# Patient Record
Sex: FEMALE | Race: ASIAN | NOT HISPANIC OR LATINO | Employment: FULL TIME | URBAN - METROPOLITAN AREA
[De-identification: names, ages, dates, MRNs, and addresses within clinical notes are randomized per-mention and may not be internally consistent; named-entity substitution may affect disease eponyms.]

---

## 2018-03-03 ENCOUNTER — HOSPITAL ENCOUNTER (INPATIENT)
Facility: CLINIC | Age: 28
LOS: 2 days | Discharge: HOME | DRG: 064 | End: 2018-03-06
Attending: NEUROLOGICAL SURGERY | Admitting: NEUROLOGICAL SURGERY
Payer: COMMERCIAL

## 2018-03-03 ENCOUNTER — TRANSFERRED RECORDS (OUTPATIENT)
Dept: HEALTH INFORMATION MANAGEMENT | Facility: CLINIC | Age: 28
End: 2018-03-03

## 2018-03-03 DIAGNOSIS — I60.9 SUBARACHNOID HEMORRHAGE (H): ICD-10-CM

## 2018-03-03 DIAGNOSIS — I66.02 MIDDLE CEREBRAL ARTERY STENOSIS, LEFT: Primary | ICD-10-CM

## 2018-03-03 LAB
CREAT SERPL-MCNC: 0.63 MG/DL (ref 0.6–1.1)
GFR SERPL CREATININE-BSD FRML MDRD: >60 ML/MIN/1.73M2
GLUCOSE SERPL-MCNC: 104 MG/DL (ref 70–125)
INR PPP: 0.96 (ref 0.9–1.1)
POTASSIUM SERPL-SCNC: 3.8 MMOL/L (ref 3.5–5)
TSH SERPL-ACNC: 1.56 UIU/ML (ref 0.3–5)

## 2018-03-03 ASSESSMENT — ACTIVITIES OF DAILY LIVING (ADL)
TOILETING: 0-->INDEPENDENT
FALL_HISTORY_WITHIN_LAST_SIX_MONTHS: NO
TRANSFERRING: 0-->INDEPENDENT
RETIRED_COMMUNICATION: 0-->UNDERSTANDS/COMMUNICATES WITHOUT DIFFICULTY
DRESS: 0-->INDEPENDENT
AMBULATION: 0-->INDEPENDENT
RETIRED_EATING: 0-->INDEPENDENT
SWALLOWING: 0-->SWALLOWS FOODS/LIQUIDS WITHOUT DIFFICULTY
BATHING: 0-->INDEPENDENT
COGNITION: 0 - NO COGNITION ISSUES REPORTED

## 2018-03-03 ASSESSMENT — VISUAL ACUITY: OU: BASELINE

## 2018-03-03 NOTE — IP AVS SNAPSHOT
[address] [address]    Phone:  [phone]                                       After Visit Summary   3/3/2018    Aliya Rios    MRN: [id number]           After Visit Summary Signature Page     I have received my discharge instructions, and my questions have been answered. I have discussed any challenges I see with this plan with the nurse or doctor.    ..........................................................................................................................................  Patient/Patient Representative Signature      ..........................................................................................................................................  Patient Representative Print Name and Relationship to Patient    ..................................................               ................................................  Date                                            Time    ..........................................................................................................................................  Reviewed by Signature/Title    ...................................................              ..............................................  Date                                                            Time

## 2018-03-03 NOTE — IP AVS SNAPSHOT
MRN:[id number]                      After Visit Summary   3/3/2018    Aliya Rios    MRN: [id number]           Thank you!     Thank you for choosing [facility] for your care. Our goal is always to provide you with excellent care. Hearing back from our patients is one way we can continue to improve our services. Please take a few minutes to complete the written survey that you may receive in the mail after you visit with us. Thank you!        Patient Information     Date Of Birth          1990        Designated Caregiver       Most Recent Value    Caregiver    Will someone help with your care after discharge? yes    Name of designated caregiver Amina Qi    Phone number of caregiver [phone]    Caregiver address [address]      About your hospital stay     You were admitted on:  March 3, 2018 You last received care in the:  [address] [facility]    You were discharged on:  March 6, 2018        Reason for your hospital stay       Subarachnoid Hemorrhage, found to have a Left middle cerebral artery stenosis.                  Who to Call     For medical emergencies, please call 911.  For non-urgent questions about your medical care, please call your primary care provider or clinic, None          Attending Provider     Provider Gurpreet Hawkins MD Neurosurgery    [facility], Aura Romero MD Internal Medicine       Primary Care Provider Fax #    Provider Not In System [phone]       When to contact your care team       Call 911 and return to the Emergency Room if you have any of the following:  - sudden onset excruciating headache, weakness, numbness, tingling, vertigo/lightheadedness, dizziness, vision or hearing changes or other concerning symptom.                  After Care Instructions     Activity       Your activity upon discharge: activity as tolerated            Diet       Follow this diet upon discharge: Orders Placed This Encounter      Advance Diet as  Tolerated: Regular Diet Adult            Discharge Instructions       1. Follow up with your Primary Care Provider and Stroke Clinic as described above.   2. Begin taking your prescribed medications (Aspirin and Atorvastatin).  3. Discuss with your Primary Care Provider and/or Stroke Neurologist if you would benefit from a sleep study to assess for obstructive sleep apnea.                  Follow-up Appointments     Adult [facility] Follow-up and recommended labs and tests       Follow up with primary care provider within 7 days to evaluate medication change and for hospital follow- up.  No follow up labs or test are needed.    Follow-up with Stroke Clinic in 2-3 months or at next available appointment. Follow up with an imaging study (Head CTA) prior to appointment, results as well as medication management to be discussed at appointment. You will be contacted to schedule a Stroke Clinic appointment. If you have not been contacted to schedule a stroke follow-up appointment within 7 days of discharge, please contact Stroke Neurology Clinic at [phone], pick option #1.   If you have other stroke related questions, please call [phone], pick option #3, and ask to speak to Khai Liu RN Stroke/Endovascular Care Coordinator.  If you have any urgent stroke related questions after hours, please contact the hospital  at [phone] and ask to be connected to a stroke provider.         Appointments on [place] and/or [facility] (with [facility] or [facility] provider or service). Call [phone] if you haven't heard regarding these appointments within 7 days of discharge.                  Future tests that were ordered for you     CT Angiogram head neck w & w/o contrast       F/u on Lt M1 stenosis                  Pending Results     Date and Time Order Name Status Description    3/5/2018 1046 US Transcranial Doppler Complete Port In process     3/4/2018 0721 IR Carotid Cerebral Angiogram Bilateral Preliminary   "           Statement of Approval     Ordered          18 1202  I have reviewed and agree with all the recommendations and orders detailed in this document.  EFFECTIVE NOW     Approved and electronically signed by:  Cain Lewis MD             Admission Information     Date & Time Provider Department Dept. Phone    3/3/2018 Aura Paniagua MD [address] [facility] [phone]      Your Vitals Were     Blood Pressure Temperature Respirations Weight Pulse Oximetry       117/77 (BP Location: Right arm) 97.4  F (36.3  C) (Oral) 16 78.7 kg (173 lb 8 oz) 97%       MyChart Information     [employer] lets you send messages to your doctor, view your test results, renew your prescriptions, schedule appointments and more. To sign up, go to [email or web address] . Click on \"Log in\" on the left side of the screen, which will take you to the Welcome page. Then click on \"Sign up Now\" on the right side of the page.     You will be asked to enter the access code listed below, as well as some personal information. Please follow the directions to create your username and password.     Your access code is: [email or web address]  Expires: 2018 11:59 AM     Your access code will  in 90 days. If you need help or a new code, please call your [facility] clinic or [phone].        Care EveryWhere ID     This is your Care EveryWhere ID. This could be used by other organizations to access your [facility] medical records  [id number]        Equal Access to Services     [facility]: Hadii aad ku hadasho Soomaali, waaxda luqadaha, qaybta kaalmada adeegyada, alec jones . So [facility] [phone].    ATENCIÓN: Si habla español, tiene a mancuso disposición servicios gratuitos de asistencia lingüística. Llame al [phone].    We comply with applicable federal civil rights laws and [place] laws. We do not discriminate on the basis of race, color, national origin, age, disability, sex, sexual orientation, or " gender identity.               Review of your medicines      START taking        Dose / Directions    aspirin 81 MG chewable tablet   Used for:  Middle cerebral artery stenosis, left        Dose:  81 mg   Start taking on:  3/7/2018   Take 1 tablet (81 mg) by mouth daily   Quantity:  30 tablet   Refills:  11       atorvastatin 20 MG tablet   Commonly known as:  LIPITOR   Used for:  Middle cerebral artery stenosis, left        Dose:  20 mg   Take 1 tablet (20 mg) by mouth daily   Quantity:  30 tablet   Refills:  11            Where to get your medicines      Some of these will need a paper prescription and others can be bought over the counter. Ask your nurse if you have questions.     Bring a paper prescription for each of these medications     aspirin 81 MG chewable tablet    atorvastatin 20 MG tablet                Protect others around you: Learn how to safely use, store and throw away your medicines at www.disposemymeds.org.             Medication List: This is a list of all your medications and when to take them. Check marks below indicate your daily home schedule. Keep this list as a reference.      Medications           Morning Afternoon Evening Bedtime As Needed    aspirin 81 MG chewable tablet   Take 1 tablet (81 mg) by mouth daily   Start taking on:  3/7/2018   Last time this was given:  81 mg on 3/6/2018  8:40 AM                                atorvastatin 20 MG tablet   Commonly known as:  LIPITOR   Take 1 tablet (20 mg) by mouth daily   Last time this was given:  20 mg on 3/6/2018 11:48 AM

## 2018-03-04 ENCOUNTER — APPOINTMENT (OUTPATIENT)
Dept: ULTRASOUND IMAGING | Facility: CLINIC | Age: 28
DRG: 064 | End: 2018-03-04
Attending: STUDENT IN AN ORGANIZED HEALTH CARE EDUCATION/TRAINING PROGRAM
Payer: COMMERCIAL

## 2018-03-04 ENCOUNTER — APPOINTMENT (OUTPATIENT)
Dept: INTERVENTIONAL RADIOLOGY/VASCULAR | Facility: CLINIC | Age: 28
DRG: 064 | End: 2018-03-04
Attending: NEUROLOGICAL SURGERY
Payer: COMMERCIAL

## 2018-03-04 PROBLEM — I60.9 SUBARACHNOID HEMORRHAGE (H): Status: ACTIVE | Noted: 2018-03-04

## 2018-03-04 LAB
ABO + RH BLD: NORMAL
ABO + RH BLD: NORMAL
ANION GAP SERPL CALCULATED.3IONS-SCNC: 7 MMOL/L (ref 3–14)
APTT PPP: 30 SEC (ref 22–37)
B-HCG SERPL-ACNC: <1 IU/L (ref 0–5)
BLD GP AB SCN SERPL QL: NORMAL
BLOOD BANK CMNT PATIENT-IMP: NORMAL
BUN SERPL-MCNC: 10 MG/DL (ref 7–30)
CALCIUM SERPL-MCNC: 8.5 MG/DL (ref 8.5–10.1)
CHLORIDE SERPL-SCNC: 109 MMOL/L (ref 94–109)
CO2 SERPL-SCNC: 24 MMOL/L (ref 20–32)
CREAT SERPL-MCNC: 0.55 MG/DL (ref 0.52–1.04)
ERYTHROCYTE [DISTWIDTH] IN BLOOD BY AUTOMATED COUNT: 16.4 % (ref 10–15)
FIBRINOGEN PPP-MCNC: 300 MG/DL (ref 200–420)
GFR SERPL CREATININE-BSD FRML MDRD: >90 ML/MIN/1.7M2
GLUCOSE SERPL-MCNC: 86 MG/DL (ref 70–99)
HCT VFR BLD AUTO: 33.5 % (ref 35–47)
HGB BLD-MCNC: 10.4 G/DL (ref 11.7–15.7)
INR PPP: 0.98 (ref 0.86–1.14)
MCH RBC QN AUTO: 24.6 PG (ref 26.5–33)
MCHC RBC AUTO-ENTMCNC: 31 G/DL (ref 31.5–36.5)
MCV RBC AUTO: 79 FL (ref 78–100)
MRSA DNA SPEC QL NAA+PROBE: NEGATIVE
PLATELET # BLD AUTO: 325 10E9/L (ref 150–450)
POTASSIUM SERPL-SCNC: 3.5 MMOL/L (ref 3.4–5.3)
RBC # BLD AUTO: 4.23 10E12/L (ref 3.8–5.2)
SODIUM SERPL-SCNC: 140 MMOL/L (ref 133–144)
SPECIMEN EXP DATE BLD: NORMAL
SPECIMEN SOURCE: NORMAL
TROPONIN I SERPL-MCNC: <0.015 UG/L (ref 0–0.04)
WBC # BLD AUTO: 8.6 10E9/L (ref 4–11)

## 2018-03-04 PROCEDURE — 87641 MR-STAPH DNA AMP PROBE: CPT | Performed by: NEUROLOGICAL SURGERY

## 2018-03-04 PROCEDURE — 25000128 H RX IP 250 OP 636: Performed by: RADIOLOGY

## 2018-03-04 PROCEDURE — 93010 ELECTROCARDIOGRAM REPORT: CPT | Performed by: INTERNAL MEDICINE

## 2018-03-04 PROCEDURE — 93886 INTRACRANIAL COMPLETE STUDY: CPT

## 2018-03-04 PROCEDURE — 93005 ELECTROCARDIOGRAM TRACING: CPT

## 2018-03-04 PROCEDURE — 80048 BASIC METABOLIC PNL TOTAL CA: CPT | Performed by: NEUROLOGICAL SURGERY

## 2018-03-04 PROCEDURE — 85027 COMPLETE CBC AUTOMATED: CPT | Performed by: NEUROLOGICAL SURGERY

## 2018-03-04 PROCEDURE — 25000125 ZZHC RX 250: Performed by: STUDENT IN AN ORGANIZED HEALTH CARE EDUCATION/TRAINING PROGRAM

## 2018-03-04 PROCEDURE — 87640 STAPH A DNA AMP PROBE: CPT | Performed by: NEUROLOGICAL SURGERY

## 2018-03-04 PROCEDURE — 25000132 ZZH RX MED GY IP 250 OP 250 PS 637: Performed by: STUDENT IN AN ORGANIZED HEALTH CARE EDUCATION/TRAINING PROGRAM

## 2018-03-04 PROCEDURE — 99153 MOD SED SAME PHYS/QHP EA: CPT

## 2018-03-04 PROCEDURE — 86850 RBC ANTIBODY SCREEN: CPT | Performed by: NEUROLOGICAL SURGERY

## 2018-03-04 PROCEDURE — 20000004 ZZH R&B ICU UMMC

## 2018-03-04 PROCEDURE — 36415 COLL VENOUS BLD VENIPUNCTURE: CPT | Performed by: NEUROLOGICAL SURGERY

## 2018-03-04 PROCEDURE — 85730 THROMBOPLASTIN TIME PARTIAL: CPT | Performed by: NEUROLOGICAL SURGERY

## 2018-03-04 PROCEDURE — 36227 PLACE CATH XTRNL CAROTID: CPT

## 2018-03-04 PROCEDURE — 85610 PROTHROMBIN TIME: CPT | Performed by: NEUROLOGICAL SURGERY

## 2018-03-04 PROCEDURE — 36224 PLACE CATH CAROTD ART: CPT | Mod: XS

## 2018-03-04 PROCEDURE — 86900 BLOOD TYPING SEROLOGIC ABO: CPT | Performed by: NEUROLOGICAL SURGERY

## 2018-03-04 PROCEDURE — 61640 DILATE IC VASOSPASM INIT: CPT

## 2018-03-04 PROCEDURE — 25000125 ZZHC RX 250: Performed by: RADIOLOGY

## 2018-03-04 PROCEDURE — 27210802 ZZH SHEATH CR1

## 2018-03-04 PROCEDURE — 25000128 H RX IP 250 OP 636: Performed by: NEUROLOGICAL SURGERY

## 2018-03-04 PROCEDURE — 84484 ASSAY OF TROPONIN QUANT: CPT | Performed by: NEUROLOGICAL SURGERY

## 2018-03-04 PROCEDURE — 27210738 ZZH ACCESSORY CR2

## 2018-03-04 PROCEDURE — 27210886 ZZH ACCESSORY CR5

## 2018-03-04 PROCEDURE — 36226 PLACE CATH VERTEBRAL ART: CPT

## 2018-03-04 PROCEDURE — 80061 LIPID PANEL: CPT | Performed by: NEUROLOGICAL SURGERY

## 2018-03-04 PROCEDURE — C1769 GUIDE WIRE: HCPCS

## 2018-03-04 PROCEDURE — 85384 FIBRINOGEN ACTIVITY: CPT | Performed by: NEUROLOGICAL SURGERY

## 2018-03-04 PROCEDURE — B31Q1ZZ FLUOROSCOPY OF CERVICO-CEREBRAL ARCH USING LOW OSMOLAR CONTRAST: ICD-10-PCS | Performed by: NEUROLOGICAL SURGERY

## 2018-03-04 PROCEDURE — 84702 CHORIONIC GONADOTROPIN TEST: CPT | Performed by: NEUROLOGICAL SURGERY

## 2018-03-04 PROCEDURE — 27810149 ZZH COIL/EMBOLIC DEVICE CR12

## 2018-03-04 PROCEDURE — 86901 BLOOD TYPING SEROLOGIC RH(D): CPT | Performed by: NEUROLOGICAL SURGERY

## 2018-03-04 PROCEDURE — 99152 MOD SED SAME PHYS/QHP 5/>YRS: CPT

## 2018-03-04 PROCEDURE — 27210907 ZZH KIT CR9

## 2018-03-04 RX ORDER — FENTANYL CITRATE 50 UG/ML
25-50 INJECTION, SOLUTION INTRAMUSCULAR; INTRAVENOUS EVERY 5 MIN PRN
Status: DISCONTINUED | OUTPATIENT
Start: 2018-03-04 | End: 2018-03-04 | Stop reason: HOSPADM

## 2018-03-04 RX ORDER — POTASSIUM CHLORIDE 7.45 MG/ML
10 INJECTION INTRAVENOUS
Status: DISCONTINUED | OUTPATIENT
Start: 2018-03-04 | End: 2018-03-06 | Stop reason: HOSPADM

## 2018-03-04 RX ORDER — ACETAMINOPHEN 325 MG/1
325 TABLET ORAL EVERY 4 HOURS PRN
Status: DISCONTINUED | OUTPATIENT
Start: 2018-03-04 | End: 2018-03-04

## 2018-03-04 RX ORDER — LIDOCAINE 40 MG/G
CREAM TOPICAL
Status: DISCONTINUED | OUTPATIENT
Start: 2018-03-04 | End: 2018-03-04 | Stop reason: HOSPADM

## 2018-03-04 RX ORDER — IODIXANOL 320 MG/ML
150 INJECTION, SOLUTION INTRAVASCULAR ONCE
Status: COMPLETED | OUTPATIENT
Start: 2018-03-04 | End: 2018-03-04

## 2018-03-04 RX ORDER — POTASSIUM CHLORIDE 29.8 MG/ML
20 INJECTION INTRAVENOUS
Status: DISCONTINUED | OUTPATIENT
Start: 2018-03-04 | End: 2018-03-06 | Stop reason: HOSPADM

## 2018-03-04 RX ORDER — POTASSIUM CHLORIDE 750 MG/1
20-40 TABLET, EXTENDED RELEASE ORAL
Status: DISCONTINUED | OUTPATIENT
Start: 2018-03-04 | End: 2018-03-06 | Stop reason: HOSPADM

## 2018-03-04 RX ORDER — SODIUM CHLORIDE 9 MG/ML
INJECTION, SOLUTION INTRAVENOUS CONTINUOUS
Status: DISCONTINUED | OUTPATIENT
Start: 2018-03-04 | End: 2018-03-05

## 2018-03-04 RX ORDER — VERAPAMIL HYDROCHLORIDE 2.5 MG/ML
15 INJECTION, SOLUTION INTRAVENOUS ONCE
Status: COMPLETED | OUTPATIENT
Start: 2018-03-04 | End: 2018-03-04

## 2018-03-04 RX ORDER — SODIUM CHLORIDE 9 MG/ML
INJECTION, SOLUTION INTRAVENOUS CONTINUOUS
Status: DISCONTINUED | OUTPATIENT
Start: 2018-03-04 | End: 2018-03-04

## 2018-03-04 RX ORDER — ACETAMINOPHEN 325 MG/1
325 TABLET ORAL EVERY 4 HOURS PRN
Status: DISCONTINUED | OUTPATIENT
Start: 2018-03-04 | End: 2018-03-06 | Stop reason: HOSPADM

## 2018-03-04 RX ORDER — POTASSIUM CHLORIDE 1.5 G/1.58G
20-40 POWDER, FOR SOLUTION ORAL
Status: DISCONTINUED | OUTPATIENT
Start: 2018-03-04 | End: 2018-03-06 | Stop reason: HOSPADM

## 2018-03-04 RX ORDER — POTASSIUM CL/LIDO/0.9 % NACL 10MEQ/0.1L
10 INTRAVENOUS SOLUTION, PIGGYBACK (ML) INTRAVENOUS
Status: DISCONTINUED | OUTPATIENT
Start: 2018-03-04 | End: 2018-03-06 | Stop reason: HOSPADM

## 2018-03-04 RX ORDER — METOCLOPRAMIDE HYDROCHLORIDE 5 MG/ML
10 INJECTION INTRAMUSCULAR; INTRAVENOUS EVERY 6 HOURS PRN
Status: DISCONTINUED | OUTPATIENT
Start: 2018-03-04 | End: 2018-03-06 | Stop reason: HOSPADM

## 2018-03-04 RX ORDER — PROCHLORPERAZINE MALEATE 10 MG
10 TABLET ORAL EVERY 6 HOURS PRN
Status: DISCONTINUED | OUTPATIENT
Start: 2018-03-04 | End: 2018-03-06 | Stop reason: HOSPADM

## 2018-03-04 RX ORDER — FLUMAZENIL 0.1 MG/ML
0.2 INJECTION, SOLUTION INTRAVENOUS
Status: DISCONTINUED | OUTPATIENT
Start: 2018-03-04 | End: 2018-03-04 | Stop reason: HOSPADM

## 2018-03-04 RX ORDER — PROCHLORPERAZINE 25 MG
25 SUPPOSITORY, RECTAL RECTAL EVERY 12 HOURS PRN
Status: DISCONTINUED | OUTPATIENT
Start: 2018-03-04 | End: 2018-03-06 | Stop reason: HOSPADM

## 2018-03-04 RX ORDER — NALOXONE HYDROCHLORIDE 0.4 MG/ML
.1-.4 INJECTION, SOLUTION INTRAMUSCULAR; INTRAVENOUS; SUBCUTANEOUS
Status: DISCONTINUED | OUTPATIENT
Start: 2018-03-04 | End: 2018-03-06 | Stop reason: HOSPADM

## 2018-03-04 RX ORDER — OXYCODONE HYDROCHLORIDE 5 MG/1
10 TABLET ORAL EVERY 8 HOURS PRN
Status: DISCONTINUED | OUTPATIENT
Start: 2018-03-04 | End: 2018-03-06 | Stop reason: HOSPADM

## 2018-03-04 RX ORDER — VERAPAMIL HYDROCHLORIDE 40 MG/1
40 TABLET ORAL EVERY 8 HOURS
Status: DISCONTINUED | OUTPATIENT
Start: 2018-03-04 | End: 2018-03-04

## 2018-03-04 RX ORDER — NALOXONE HYDROCHLORIDE 0.4 MG/ML
.1-.4 INJECTION, SOLUTION INTRAMUSCULAR; INTRAVENOUS; SUBCUTANEOUS
Status: DISCONTINUED | OUTPATIENT
Start: 2018-03-04 | End: 2018-03-04 | Stop reason: HOSPADM

## 2018-03-04 RX ORDER — METOCLOPRAMIDE 5 MG/1
10 TABLET ORAL EVERY 6 HOURS PRN
Status: DISCONTINUED | OUTPATIENT
Start: 2018-03-04 | End: 2018-03-06 | Stop reason: HOSPADM

## 2018-03-04 RX ORDER — VERAPAMIL HYDROCHLORIDE 40 MG/1
40 TABLET ORAL EVERY 8 HOURS
Status: DISCONTINUED | OUTPATIENT
Start: 2018-03-04 | End: 2018-03-05

## 2018-03-04 RX ORDER — MAGNESIUM SULFATE HEPTAHYDRATE 40 MG/ML
4 INJECTION, SOLUTION INTRAVENOUS EVERY 4 HOURS PRN
Status: DISCONTINUED | OUTPATIENT
Start: 2018-03-04 | End: 2018-03-04

## 2018-03-04 RX ORDER — ONDANSETRON 4 MG/1
4 TABLET, ORALLY DISINTEGRATING ORAL EVERY 6 HOURS PRN
Status: DISCONTINUED | OUTPATIENT
Start: 2018-03-04 | End: 2018-03-06 | Stop reason: HOSPADM

## 2018-03-04 RX ORDER — ONDANSETRON 2 MG/ML
4 INJECTION INTRAMUSCULAR; INTRAVENOUS EVERY 6 HOURS PRN
Status: DISCONTINUED | OUTPATIENT
Start: 2018-03-04 | End: 2018-03-06 | Stop reason: HOSPADM

## 2018-03-04 RX ORDER — SODIUM CHLORIDE 9 MG/ML
INJECTION, SOLUTION INTRAVENOUS CONTINUOUS
Status: DISCONTINUED | OUTPATIENT
Start: 2018-03-04 | End: 2018-03-04 | Stop reason: HOSPADM

## 2018-03-04 RX ADMIN — LIDOCAINE HYDROCHLORIDE 3 ML: 10 INJECTION, SOLUTION EPIDURAL; INFILTRATION; INTRACAUDAL; PERINEURAL at 10:45

## 2018-03-04 RX ADMIN — VERAPAMIL HYDROCHLORIDE 40 MG: 40 TABLET, FILM COATED ORAL at 15:09

## 2018-03-04 RX ADMIN — MIDAZOLAM 1.5 MG: 1 INJECTION INTRAMUSCULAR; INTRAVENOUS at 10:45

## 2018-03-04 RX ADMIN — Medication 2 G: at 22:23

## 2018-03-04 RX ADMIN — ACETAMINOPHEN 325 MG: 325 TABLET, FILM COATED ORAL at 14:00

## 2018-03-04 RX ADMIN — FENTANYL CITRATE 75 MCG: 50 INJECTION, SOLUTION INTRAMUSCULAR; INTRAVENOUS at 10:46

## 2018-03-04 RX ADMIN — IODIXANOL 30 ML: 320 INJECTION, SOLUTION INTRAVASCULAR at 10:40

## 2018-03-04 RX ADMIN — SODIUM CHLORIDE: 9 INJECTION, SOLUTION INTRAVENOUS at 11:16

## 2018-03-04 RX ADMIN — VERAPAMIL HYDROCHLORIDE 40 MG: 40 TABLET, FILM COATED ORAL at 22:34

## 2018-03-04 RX ADMIN — SODIUM CHLORIDE: 9 INJECTION, SOLUTION INTRAVENOUS at 00:54

## 2018-03-04 RX ADMIN — Medication 2 G: at 15:03

## 2018-03-04 RX ADMIN — VERAPAMIL HYDROCHLORIDE 15 MG: 2.5 INJECTION INTRAVENOUS at 10:12

## 2018-03-04 ASSESSMENT — VISUAL ACUITY
OU: NORMAL ACUITY
OU: BASELINE
OU: NORMAL ACUITY
OU: BASELINE
OU: NORMAL ACUITY
OU: BASELINE
OU: BASELINE
OU: NORMAL ACUITY
OU: BASELINE
OU: NORMAL ACUITY
OU: BASELINE
OU: NORMAL ACUITY
OU: NORMAL ACUITY
OU: BASELINE
OU: BASELINE
OU: NORMAL ACUITY
OU: BASELINE
OU: NORMAL ACUITY
OU: BASELINE

## 2018-03-04 ASSESSMENT — PAIN DESCRIPTION - DESCRIPTORS: DESCRIPTORS: ACHING;DISCOMFORT;THROBBING

## 2018-03-04 NOTE — PROGRESS NOTES
Neurosurgery Brief Note    Patient underwent angiogram which did not reveal aneurysms. She's now under the care of neuro-critical care team. Neurosurgery will sign off. Please page with questions or concerns.    -----------------------------------  Nelia Walters MD, MS  Neurosurgery PGY-1

## 2018-03-04 NOTE — PROGRESS NOTES
[facility], [place]     Endovascular Surgical Neuroradiology Pre-Procedure Note      HPI:  Aliya Rios is a 28 year old female. She had a sudden onset of headache yesterday morning, She had difficulty finding words and felt like her cognition was not normal. She returned to her baseline normal. She was evaluated at another hospital. Work-up CT and MRI showed left frontal peripheral subarachnoid hemorrhage. No aneurysm or other vascular malformation was found. She was transferred to [facility] for further cares. Today we are going to perform a diagnostic cerebral angiography to further assess for etiology of her subarachnoid hemorrhage with intention to treat.     Medical History:  History reviewed. No pertinent past medical history.    Surgical History:  No past surgical history on file.    Family History:  No family history on file.    Social History:  Social History     Social History     Marital status: N/A     Spouse name: N/A     Number of children: N/A     Years of education: N/A     Occupational History     Not on file.     Social History Main Topics     Smoking status: Not on file     Smokeless tobacco: Not on file     Alcohol use Not on file     Drug use: Not on file     Sexual activity: Not on file     Other Topics Concern     Not on file     Social History Narrative     No narrative on file       Allergies:  Not on File    Is there a contrast allergy?  No    Medications:  No prescriptions prior to admission.   .    ROS:  The 10 point Review of Systems is negative other than noted in the HPI or here.     PHYSICAL EXAMINATION  Vital Signs:  B/P: 107/63,  T: 97.9,  P: Data Unavailable,  R: 16    Cardio:  RRR  Pulmonary:  no respiratory distress  Abdomen:  soft, non-tender    Neurologic  Mental Status:  fully alert, attentive and oriented, speech clear and fluent  Cranial Nerves:  visual fields intact, PERRL, EOMI with normal smooth pursuit, facial movements symmetric, hearing not  formally tested but intact to conversation, palate elevation symmetric and uvula midline, no dysarthria, shoulder shrug strong bilaterally, tongue protrusion midline  Motor:  normal tone throughout, no pronator drift, strength 5/5 throughout upper and lower extremities  Sensory:  Intact to light touch x4  Coordination:  FNF intact bilaterally    Pre-procedure National Institutes of Health Stroke Scale:   Not applicable    LABS  (most recent Cr, BUN, GFR, PLT, INR, PTT within the past 7 days):    Recent Labs  Lab 03/04/18  0101   CR 0.55   BUN 10   GFRESTIMATED >90   GFRESTBLACK >90      INR 0.98   PTT 30        Platelet Function P2Y12 (PRU):  Not applicable      ASSESSMENT: Left frontal subarachnoid hemorrhage    PLAN: Diagnostic cerebral angiography        PRE-PROCEDURE SEDATION ASSESSMENT     Pre-Procedure Sedation Assessment done at 0830.    Expected Level:  Moderate Sedation    Indication:  Sedation is required to allow for neurointerventional procedure.    Consent obtained from patient after discussing the risks, benefits and alternatives.     PO Intake:  Appropriately NPO for procedure    ASA Class:  Class 2 - MILD SYSTEMIC DISEASE, NO ACUTE PROBLEMS, NO FUNCTIONAL LIMITATIONS.    Mallampati:  Grade 2:  Soft palate, base of uvula, tonsillar pillars, and portion of posterior pharyngeal wall visible    History and physical reviewed and no updates needed. I have reviewed the lab findings, diagnostic data, medications, and the plan for sedation. I have determined this patient to be an appropriate candidate for the planned sedation and procedure and have reassessed the patient IMMEDIATELY PRIOR to sedation and procedure.    Patient was discussed with the Attending, Dr. Tsang, who agrees with the plan.    Lex Bernstein   Pager: [phone]    I have reviewed the history. I have seen and examined the patient myself and agree with the assessment and plan above.  MAGNUS Tsang MD

## 2018-03-04 NOTE — PROGRESS NOTES
D: Patient arrived at 0930 . Table ready at 0900 (in house 3/3/2018; this is a diagnostic angio with intent to treat - nonemergent              Aliya Rios underwent a  Cerebral  angiogram  by Dr Bernstein and Sharan on 3/4/2018   Time out done. Yes               Patient identity confirmed with 2 identifiers  Yes               Site marked  Yes / No              Support staff present for case :Dr. Bernstein arrived at 0930.                                                           VITALIY Navarrete and Fabricio arrived at 0815.                                                           Bela Au and Bowen arrived at 0800.    A:  Patient moved to table, monitoring equipment applied. Insertion site prepared by technologist.    Start time of procedure: 0940    Puncture made to : RFA    At  0953, sheath in at 0955     Intervention done:Medications Intra-arterial:  verapamil 15 mg IA at 1012 to left ICA   Sedation Medication given:   Yes, see MAR                Medication total dose given- Versed  1.5 mg                                                        Fentanyl  75    Mcg                                                                                                             Other lidocaine 1% by MD to RFA 3 ml   IR RN Monitoring Times: Start:0933                                                   Stop:1033     Introducer removed, Manual pressure held for 10 minutes,      Groin site appearance : soft, dry, flat   End time of procedure : 1033   Additional Puncture site(s): none           P:  Patient to recover in 4A   Follow up : on 4A by neurointerventional team  Post Procedure Education:  The following information has been reviewed with 4A RN (pt sedated)   1. Maintain bedrest with right extremity straight until 1630..   2. Notify nurse immediately if having any bleeding from site, any changes in sensation,   or changes in strength.   3. Notify nurse if you have to go the bathroom, the staff will assist you.   4. Nurses will be  doing frequent assessments post procedure, which will include                   checking the pulses in your feet, groin/access site, vital signs, and neuro exam.    5. Please press your call light if you, or your family, have any questions or concerns        regarding your care.    Bedside neurocheck and RFA site check and distal pulses done on 4A with 4A VITALIY Wild at 1045, VITALIY Wild agreed to chart 1045 neurocheck.  Pt's fiance and pt's father stayed in [facility] Waiting Room so MDs can update them on findings.

## 2018-03-04 NOTE — H&P
[facility]  Neurosurgery     History and Physical    CC: Headaches    HPI: On the morning of 3/3/2018 at approximately 1000  the patient had a sudden onset of a left sided headache immediately preceding a left V2-3 distribution of numbness for approximately 10-15 minutes, during which time she had some difficulty with her speech that she describes as difficulty producing the words. Immediately following this she had a generalized feeling of weakness. After the episode she returned to her neurologic baseline and the headache subsided. She was then taken to an OSH ED where a CTA and MRI were performed which demonstrated a left frontal subacute subarachnoid hemorrhage. Neurosurgery at [facility] was consulted where the working differential included reversible cerebral vasoconstriction syndrome. Due to the lack of beds at [facility] transfer to [facility] was arranged.    No recent fevers, chills, weight change, chest pain, shortness of breath, cough, abdominal pain, nausea, vomiting, and syncope. The patient also denies headaches, double vision, blurry vision, weakness, LOC, changes in sensation, taste, smell, bowel or bladder incontinence, or other neurologic symptoms.    Past Medical History   has no past medical history on file.    Past Surgical History   has no past surgical history on file.    Family History  family history is not on file.    Social History   Denies EtOh and cigarettes.     Medications  No prescriptions prior to admission.       Allergies  Allergies not on file    ROS: 10 point ROS of systems were all negative except for pertinent positives noted in HPI.   PE:  Blood pressure 124/68, temperature 97.9  F (36.6  C), temperature source Oral, SpO2 99 %.    NEUROLOGIC:  -- Awake; Alert; oriented x 3  -- Follows commands briskly  -- +repetition, calculation, and naming  -- Speech fluent, spontaneous. No aphasia or dysarthria.  -- no gaze preference. No apparent hemineglect.  Cranial Nerves:  --  visual fields full to confrontation, PERRL 3-2mm bilat and brisk, extraocular movements intact  -- face symmetrical, tongue midline  -- sensory V1-V3 intact bilaterally  -- palate elevates symmetrically, uvula midline  -- hearing grossly intact bilat  -- Trapezii 5/5 strength bilat symmetric  -- Cerebellar: Finger nose finger without dysmetria, intact rapid alternating motions bilaterally    Motor:  Normal bulk / tone; no tremor, rigidity, or bradykinesia.  No muscle wasting or fasciculations  No Pronator Drift     Delt Bi Tri FE IP Quad Hamst TibAnt EHL Gastroc    C5 C6 C7 C8/T1 L2 L3 L4-S1 L4 L5 S1   R 5 5 5 5 5 5 5 5 5 5   L 5 5 5 5 5 5 5 5 5 5     Sensory:   intact to LT    Reflexes:     Bi Tri BR Giovanny Pat Ach Bab    C5-6 C7-8 C6 UMN L2-4 S1 UMN   R 2+ 2+ 2+ Norm 2+ 2+ Norm   L 2+ 2+ 2+ Norm 2+ 2+ Norm     Labs    Coagulation Profile  No lab results found in last 7 days.  CSFNo results for input(s): CGLU, CTP in the last 168 hours.    Invalid input(s): CCSF  MICRONo results for input(s): CULT in the last 168 hours.  Liver Function Tests  No lab results found in last 7 days.  Lactate  Invalid input(s): LACTATE    IMAGING:  No orders to display       Assessment: Aliya Rios is a 28 year old female with spontaneous SAH.     The following conditions are also present, complicating the patient's current management, as described in the Assessment and Plan:   brain compression      Plan:  - No neurosurgical intervention indicated at this time   - Requires ICU level observation at this time  - NPO. IVF  - Pre-angiogram labs.   - Plan for angiogram in AM.       The patient was discussed with the neurosurgery chief resident, who agrees with the above stated plan.    Contact the neurosurgery resident on call with questions.    Dial * * *[phone]: Enter [phone] when prompted.   Lopez Nelson MD, PhD  Neurosurgery PGY-3

## 2018-03-04 NOTE — PLAN OF CARE
Problem: Patient Care Overview  Goal: Plan of Care/Patient Progress Review  Outcome: No Change  Pt is A/Ox4 with no deficits on neuro exam. Pt was transferred here for a SAH and arrived at 2320. Stroke book and education was given to patient. Pt refused Pneumo boots but they were ordered and will be put int he room when they arrive. Pt is independent and walks with no assistance. HR was 60's all night. BP was stable with systolic's under 140 all night. Pt is on RA with stable VS. Pt walked to bathroom once and had good UO but it was unmeasured. Pt is NPO. Plan is for an Angiogram at 0900. Will continue to monitor pt and notify MD of changes.

## 2018-03-04 NOTE — PROCEDURES
[facility], [place]     Endovascular Surgical Neuroradiology Post-Procedure Note    Pre-Procedure Diagnosis:  Subarachnoid hemorrhage, left MCA stenosis  Post-Procedure Diagnosis:  Subarachnoid hemorrhage, left MCA stenosis    Procedure(s):   Diagnostic cervicocerebral angiography    Findings:  Severe stenosis of the left M1 MCA, pial collaterals from the left TJ and left PCA. 15 mg intra-arterial verapamil was given. No change in left M1 stenosis after verapamil infusion.    Plan:    - Keep right leg straight for 6 hours  - Consider antiplatelet for secondary prophylaxis  - Prevent hypotension  - Monitor neuroexam closely     Primary Surgeon:  Dr. Ro Tsang  Secondary Surgeon:  Not applicable  Secondary Surgeon Review:  None  Fellow:  Amandeep  Additional Assistants:      Prior to the start of the procedure and with procedural staff participation, I verbally confirmed: the patient s identity using two indicators, relevant allergies, that the procedure was appropriate and matched the consent or emergent situation, and that the correct equipment/implants were available. Immediately prior to starting the procedure I conducted the Time Out with the procedural staff and re-confirmed the patient s name, procedure, and site/side. (The Joint Commission universal protocol was followed.)  Yes    PRU value: Not applicable    Anesthesia:  Conscious Sedation  Medications:  Fentanyl, Midazolam 75, 1.5  Puncture site:  Right Femoral Artery    Fluoroscopy time (minutes):  15.2  Radiation dose (mGy):  321    Contrast amount (mL):  30     Estimated blood loss (mL):  3 cc    Closure:  Manual    Disposition:  Will be followed in hospital by the Neuro Critical Care/Stroke team.        Sedation Post-Procedure Summary    Sedatives: Fentanyl and Midazolam (Versed)    Vital signs and pulse oximetry were monitored and remained stable throughout the procedure, and sedation was maintained until the  procedure was complete.  The patient was monitored by staff until sedation discharge criteria were met.    Patient tolerance:  Patient tolerated the procedure well with no immediate complications.    Time of sedation in minutes:  30 minutes from beginning to end of physician one to one monitoring.    Lex Bernstein  Pager:  [phone]

## 2018-03-05 ENCOUNTER — APPOINTMENT (OUTPATIENT)
Dept: ULTRASOUND IMAGING | Facility: CLINIC | Age: 28
DRG: 064 | End: 2018-03-05
Attending: STUDENT IN AN ORGANIZED HEALTH CARE EDUCATION/TRAINING PROGRAM
Payer: COMMERCIAL

## 2018-03-05 ENCOUNTER — OFFICE VISIT (OUTPATIENT)
Dept: INTERPRETER SERVICES | Facility: CLINIC | Age: 28
End: 2018-03-05
Payer: COMMERCIAL

## 2018-03-05 ENCOUNTER — APPOINTMENT (OUTPATIENT)
Dept: CARDIOLOGY | Facility: CLINIC | Age: 28
DRG: 064 | End: 2018-03-05
Attending: STUDENT IN AN ORGANIZED HEALTH CARE EDUCATION/TRAINING PROGRAM
Payer: COMMERCIAL

## 2018-03-05 LAB
ANION GAP SERPL CALCULATED.3IONS-SCNC: 7 MMOL/L (ref 3–14)
APTT PPP: 30 SEC (ref 22–37)
BUN SERPL-MCNC: 9 MG/DL (ref 7–30)
CALCIUM SERPL-MCNC: 7.4 MG/DL (ref 8.5–10.1)
CHLORIDE SERPL-SCNC: 111 MMOL/L (ref 94–109)
CHOLEST SERPL-MCNC: 161 MG/DL
CHOLEST SERPL-MCNC: 184 MG/DL
CO2 SERPL-SCNC: 22 MMOL/L (ref 20–32)
CREAT SERPL-MCNC: 0.5 MG/DL (ref 0.52–1.04)
ERYTHROCYTE [DISTWIDTH] IN BLOOD BY AUTOMATED COUNT: 16.4 % (ref 10–15)
GFR SERPL CREATININE-BSD FRML MDRD: >90 ML/MIN/1.7M2
GLUCOSE SERPL-MCNC: 87 MG/DL (ref 70–99)
HBA1C MFR BLD: 5.2 % (ref 4.3–6)
HCT VFR BLD AUTO: 31.9 % (ref 35–47)
HDLC SERPL-MCNC: 46 MG/DL
HDLC SERPL-MCNC: 55 MG/DL
HGB BLD-MCNC: 9.8 G/DL (ref 11.7–15.7)
INR PPP: 1.05 (ref 0.86–1.14)
INTERPRETATION ECG - MUSE: NORMAL
LDLC SERPL CALC-MCNC: 112 MG/DL
LDLC SERPL CALC-MCNC: 99 MG/DL
MAGNESIUM SERPL-MCNC: 2.9 MG/DL (ref 1.6–2.3)
MCH RBC QN AUTO: 24.9 PG (ref 26.5–33)
MCHC RBC AUTO-ENTMCNC: 30.7 G/DL (ref 31.5–36.5)
MCV RBC AUTO: 81 FL (ref 78–100)
NONHDLC SERPL-MCNC: 115 MG/DL
NONHDLC SERPL-MCNC: 129 MG/DL
PHOSPHATE SERPL-MCNC: 2.6 MG/DL (ref 2.5–4.5)
PLATELET # BLD AUTO: 283 10E9/L (ref 150–450)
POTASSIUM SERPL-SCNC: 3.7 MMOL/L (ref 3.4–5.3)
RBC # BLD AUTO: 3.94 10E12/L (ref 3.8–5.2)
SODIUM SERPL-SCNC: 140 MMOL/L (ref 133–144)
TRIGL SERPL-MCNC: 79 MG/DL
TRIGL SERPL-MCNC: 87 MG/DL
WBC # BLD AUTO: 6.8 10E9/L (ref 4–11)

## 2018-03-05 PROCEDURE — 25000132 ZZH RX MED GY IP 250 OP 250 PS 637: Performed by: STUDENT IN AN ORGANIZED HEALTH CARE EDUCATION/TRAINING PROGRAM

## 2018-03-05 PROCEDURE — 85610 PROTHROMBIN TIME: CPT | Performed by: STUDENT IN AN ORGANIZED HEALTH CARE EDUCATION/TRAINING PROGRAM

## 2018-03-05 PROCEDURE — 85027 COMPLETE CBC AUTOMATED: CPT | Performed by: STUDENT IN AN ORGANIZED HEALTH CARE EDUCATION/TRAINING PROGRAM

## 2018-03-05 PROCEDURE — 80048 BASIC METABOLIC PNL TOTAL CA: CPT | Performed by: STUDENT IN AN ORGANIZED HEALTH CARE EDUCATION/TRAINING PROGRAM

## 2018-03-05 PROCEDURE — 85730 THROMBOPLASTIN TIME PARTIAL: CPT | Performed by: STUDENT IN AN ORGANIZED HEALTH CARE EDUCATION/TRAINING PROGRAM

## 2018-03-05 PROCEDURE — 80061 LIPID PANEL: CPT | Performed by: STUDENT IN AN ORGANIZED HEALTH CARE EDUCATION/TRAINING PROGRAM

## 2018-03-05 PROCEDURE — T1013 SIGN LANG/ORAL INTERPRETER: HCPCS | Mod: U3

## 2018-03-05 PROCEDURE — 25000132 ZZH RX MED GY IP 250 OP 250 PS 637: Performed by: NEUROLOGICAL SURGERY

## 2018-03-05 PROCEDURE — 83735 ASSAY OF MAGNESIUM: CPT | Performed by: STUDENT IN AN ORGANIZED HEALTH CARE EDUCATION/TRAINING PROGRAM

## 2018-03-05 PROCEDURE — 25000125 ZZHC RX 250: Performed by: STUDENT IN AN ORGANIZED HEALTH CARE EDUCATION/TRAINING PROGRAM

## 2018-03-05 PROCEDURE — 25000125 ZZHC RX 250: Performed by: NEUROLOGICAL SURGERY

## 2018-03-05 PROCEDURE — 25000128 H RX IP 250 OP 636: Performed by: RADIOLOGY

## 2018-03-05 PROCEDURE — 93306 TTE W/DOPPLER COMPLETE: CPT

## 2018-03-05 PROCEDURE — 93886 INTRACRANIAL COMPLETE STUDY: CPT

## 2018-03-05 PROCEDURE — 83036 HEMOGLOBIN GLYCOSYLATED A1C: CPT | Performed by: STUDENT IN AN ORGANIZED HEALTH CARE EDUCATION/TRAINING PROGRAM

## 2018-03-05 PROCEDURE — 84100 ASSAY OF PHOSPHORUS: CPT | Performed by: STUDENT IN AN ORGANIZED HEALTH CARE EDUCATION/TRAINING PROGRAM

## 2018-03-05 PROCEDURE — 93306 TTE W/DOPPLER COMPLETE: CPT | Mod: 26 | Performed by: INTERNAL MEDICINE

## 2018-03-05 PROCEDURE — 12000001 ZZH R&B MED SURG/OB UMMC

## 2018-03-05 PROCEDURE — 36415 COLL VENOUS BLD VENIPUNCTURE: CPT | Performed by: STUDENT IN AN ORGANIZED HEALTH CARE EDUCATION/TRAINING PROGRAM

## 2018-03-05 PROCEDURE — 25000128 H RX IP 250 OP 636: Performed by: NEUROLOGICAL SURGERY

## 2018-03-05 RX ORDER — ASPIRIN 81 MG/1
81 TABLET, CHEWABLE ORAL DAILY
Status: DISCONTINUED | OUTPATIENT
Start: 2018-03-05 | End: 2018-03-06 | Stop reason: HOSPADM

## 2018-03-05 RX ADMIN — VERAPAMIL HYDROCHLORIDE 40 MG: 40 TABLET, FILM COATED ORAL at 08:33

## 2018-03-05 RX ADMIN — ACETAMINOPHEN 325 MG: 325 TABLET, FILM COATED ORAL at 04:47

## 2018-03-05 RX ADMIN — Medication 2 G: at 05:55

## 2018-03-05 RX ADMIN — SODIUM PHOSPHATE, MONOBASIC, MONOHYDRATE AND SODIUM PHOSPHATE, DIBASIC, ANHYDROUS 10 MMOL: 276; 142 INJECTION, SOLUTION INTRAVENOUS at 10:05

## 2018-03-05 RX ADMIN — POTASSIUM CHLORIDE 20 MEQ: 750 TABLET, EXTENDED RELEASE ORAL at 09:51

## 2018-03-05 RX ADMIN — SODIUM CHLORIDE: 9 INJECTION, SOLUTION INTRAVENOUS at 04:47

## 2018-03-05 RX ADMIN — ASPIRIN 81 MG CHEWABLE TABLET 81 MG: 81 TABLET CHEWABLE at 12:44

## 2018-03-05 ASSESSMENT — VISUAL ACUITY
OU: NORMAL ACUITY
OU: NORMAL ACUITY;GLASSES
OU: NORMAL ACUITY
OU: NORMAL ACUITY
OU: NORMAL ACUITY;GLASSES
OU: NORMAL ACUITY
OU: NORMAL ACUITY;GLASSES
OU: NORMAL ACUITY
OU: NORMAL ACUITY
OU: NORMAL ACUITY;GLASSES
OU: NORMAL ACUITY
OU: NORMAL ACUITY
OU: NORMAL ACUITY;GLASSES

## 2018-03-05 ASSESSMENT — PAIN DESCRIPTION - DESCRIPTORS: DESCRIPTORS: HEADACHE

## 2018-03-05 NOTE — PLAN OF CARE
Problem: Patient Care Overview  Goal: Plan of Care/Patient Progress Review  Outcome: Improving  Neuro: Intact, TCDs obtained  Resp: Room air  Cardio: Normal sinus rhythm, SB<140 goal, Echo completed  GI: Regular diet  : Voiding  Access: PIV saline locked  Denies pain.  Ambulating independently.  Pt's mother at bedside this morning (interpretor used for her).  Potassium and phosphorus replaced per protocol (recheck in am).  P: Transfer to 6A.  Pt will transfer off cardiac monitoring in wheelchair with belongings.

## 2018-03-05 NOTE — PROGRESS NOTES
Neuroscience Intensive Care Progress Note  2018    Summary: Aliya Rios is a 28 year old year old female admitted on 3/3/2018 with left frontal SAH found to have intracranial stenosis of L M1 segement of MCA by cerebral angiogram.     Problem List:  1. Left frontal SAH  2. L M1 intracranial stenosis    24 hour events:  No acute events overnight. She does not have any symptoms currently.     24 Hour Vital Signs Summary:  Temperatures:  Current - Temp: 98.1  F (36.7  C); Max - Temp  Av  F (36.7  C)  Min: 97.4  F (36.3  C)  Max: 98.1  F (36.7  C)  Respiration range: Resp  Avg: 15.1  Min: 12  Max: 18  Pulse range: No Data Recorded  Blood pressure range: Systolic (24hrs), Av , Min:98 , Max:125   ; Diastolic (24hrs), Av, Min:56, Max:75    Pulse oximetry range: SpO2  Av.8 %  Min: 94 %  Max: 99 %    Ventilator Settings  Resp: 16      Intake/Output Summary (Last 24 hours) at 18 1104  Last data filed at 18 1000   Gross per 24 hour   Intake             1768 ml   Output              400 ml   Net             1368 ml       BP - Mean:  [70-90] 90    Current Medications:    verapamil  40 mg Oral or Feeding Tube Q8H     magnesium sulfate  2 g Intravenous Q8H       PRN Medications:  naloxone, Reason NIMOdipine order not selected, potassium chloride, potassium chloride, potassium chloride, potassium chloride with lidocaine, potassium chloride, magnesium sulfate, sodium phosphate, sodium phosphate, sodium phosphate, sodium phosphate, ondansetron **OR** ondansetron, prochlorperazine **OR** prochlorperazine **OR** prochlorperazine, metoclopramide **OR** metoclopramide, oxyCODONE IR, acetaminophen    Infusions:    Reason NIMOdipine order not selected       sodium chloride 75 mL/hr at 18 0447       Not on File    Physical Examination:  /75  Temp 98.1  F (36.7  C) (Oral)  Resp 16  Wt 78.7 kg (173 lb 8 oz)  SpO2 96%    General: NAD, lying on bed  HEENT: NC/AT. Mucous membranes  moist.  Cardiac: RRR. No murmur/rubs/gallops appreciated.  Respiratory: Good effort. CTAB. No w/r/r appreciated.  Abdominal: soft, nondistended  Ext: No edema.     Neurologic:  Mental Status: Fully alert, attentive. Speech clear and fluent.   Cranial Nerves: Visual fields intact. PERRL. EOMI with normal smooth pursuit. Facial movements symmetric. Facial sensation symmetric. Hearing intact to conversation. Palate elevation symmetric, uvula midline. No dysarthria. Shoulder shrug strong bilaterally. Tongue protrusion midline.  Motor: No abnormal movements. Normal tone throughout. No pronator drift. Strength 5/5 throughout upper and lower extremities.   Reflexes: 2+ and symmetric throughout. No clonus at ankles.   Sensory: Intact/symmetric to light touch throughout upper and lower extremities.   Coordination: FNF intact without ataxia or dysmetria.   Station/Gait: Deferred      Labs/Studies:  Recent Labs   Lab Test  03/05/18   0745  03/04/18   0101   NA  140  140   POTASSIUM  3.7  3.5   CHLORIDE  111*  109   CO2  22  24   ANIONGAP  7  7   GLC  87  86   BUN  9  10   CR  0.50*  0.55   EMILIA  7.4*  8.5   WBC  6.8  8.6   RBC  3.94  4.23   HGB  9.8*  10.4*   PLT  283  325       Recent Labs   Lab Test  03/05/18   0745  03/04/18   0101   INR  1.05  0.98   PTT  30  30       Assessment/Plan  Aliya Rios is a 28 year old year old female with SAH and L M1 intracranial stenosis.     Neuro:  # SAH:  Cortical left frontal SAH. Angiogram negative for aneurysm. Unclear etiology of SAH. TCD with increased velocity of L MCA this morning, however this would be expected given her stenosis.   -TCD tomorrow  -SBP goal < 140 mmHg  -No need for nimodipine or ICU monitoring    # Intracranial stenosis:  Likely chronic given good collateral development. Unrelated to SAH. Does not appear fully blocked, will plan for antiplatelet agent (dual antiplatelet was considered but was deferred for now given SAH).   -LDL 99  -A1c 5.2  -D/C verapamil  -ASA 81mg  qd  -Echocardiogram with bubble study    Resp:  No active issues.    CV:  No active issues.     SBP goal < 140 mmHg    Renal:  No active issues    Endo:  No active issues    Heme:  # Anemia:  Stable from admission. Recheck CBC tomorrow.    GI:  No active issues.     ID:  No active issues.     FEN: Regular diet  PPX:    DVT: SCDs     GI: not indicated  LDA: PIV x1    Code Status: Full  Dispo: Transfer to floor today    Patient discussed with attending physician Dr. Paniagua.    Eulogio Albert  Neurology PGY-2

## 2018-03-05 NOTE — PROGRESS NOTES
[facility]  NeuroICU Daily ICU Note:     Admission Date: 3/3/2018  Date of Service: 03/04/2018  Current hospital day: Hospital Day: 2         Assessment   Aliya Rios is a 28 year old female who is on the morning of 3/3/2018 at approximately 10:00  the patient had a sudden onset of a left sided headache immediately preceding a left V2-3 distribution of numbness for approximately 10-15 minutes, during which time she had some difficulty in finding the words. Immediately following this she had a generalized feeling of weakness. After the episode she returned to her neurologic baseline and the headache subsided. She was then taken to an OSH ED where a CTA and MRI were performed which demonstrated a left frontal subacute subarachnoid hemorrhage. Neurosurgery at OSH was consulted where the working differential included reversible cerebral vasoconstriction syndrome. Due to the lack of beds at OSH transfer to [facility] was arranged.     No recent fevers, chills, weight change, chest pain, shortness of breath, cough, abdominal pain, nausea, vomiting, and syncope. The patient also denies headaches, double vision, blurry vision, weakness, LOC, changes in sensation, taste, smell, bowel or bladder incontinence, or other neurologic symptoms.     When she came to [facility] her exam was stable, TCDs were done preangiogram  with high velocities in LT MCA & LT TJ,  Angiogram was done with LT M1 stenosis which seems to be chronic due to good collaterals with no aneurysm or venous thrombosis noticed. Verapamil Injection was done intracranial with no improvement in stenosis & no angioplasty was done.  Family is updated with in the presence of  at bedside.         Active Problems     LT Cortical SAH     LT M1 stenosis        Procedures     3/4 Angiogram           Plan 1.   Neuro: Lt Frontal SAH    Continue frequent neuro exams Q1 while in ICU. Notify MD for acute changes in exam.    The  differential at this point is RCVS vs Garcia Garcia with chronic LT M1 stenosis with no complete fit in either of these pictures.     Start Verapamil 40 mg Q 8 hrs &  Magnesium 2 G Q 8 hrs    Headache control with tylenol & oxycodone PRN     TCds tomorrow    2. CVS: hemodynamically stable    ECG Stable    Maintain SBP < 140    Hydralazine and labetolol PRN    Continuous cardiac monitoring while in ICU    3. Pulmonary: no issues, on room air    Continuous pulse oximetry    Supplemental oxygen PRN    Incentive spirometry Q1H while awake    Daily chest X ray as needed     4. Renal: no issues    mIVF -- TKO when patient is tolerating good PO intake    Electrolyte replacement protocol    Continue to monitor intake/output    Daily BMP    5. GI: No issues    Advance diet as tolerated to regular    Bowel regimen. PRN anti-emetics.    Protonix for ulcer ppx    Regular diet    6. Endocrine:     Continue glucose checks    7. Heme: no issues    Platelets > 100,000    INR < 1.5    Hemoglobin > 7    Daily CBC    8. ID: afebrile, normal WBC count    Continue to monitor for fevers and/or signs of infection    Cultures >> NA    Antimicrobial >> NA    9. Prophylaxis    GI: Protonix    DVT: SCDs while in bed    10. Disposition: Neuro ICU    PT/OT    Speech & swallow eval    11. Code: Full    Above patient and plan has been discussed with the neuro ICU Attending.         Medications:   Current Facility-Administered Medications   Medication     naloxone (NARCAN) injection 0.1-0.4 mg     Reason niMODipine order not selected     potassium chloride SA (K-DUR/KLOR-CON M) CR tablet 20-40 mEq     potassium chloride (KLOR-CON) Packet 20-40 mEq     potassium chloride 10 mEq in 100 mL sterile water intermittent infusion (premix)     potassium chloride 10 mEq in 100 mL intermittent infusion with 10 mg lidocaine     potassium chloride 20 mEq in 50 mL intermittent infusion     magnesium sulfate 2 g in NS intermittent infusion (PharMEDium or FV Cmpd)      sodium phosphate 10 mmol in D5W intermittent infusion     sodium phosphate 15 mmol in D5W intermittent infusion     sodium phosphate 20 mmol in D5W intermittent infusion     sodium phosphate 25 mmol in D5W intermittent infusion     sodium chloride 0.9% infusion     ondansetron (ZOFRAN-ODT) ODT tab 4 mg    Or     ondansetron (ZOFRAN) injection 4 mg     prochlorperazine (COMPAZINE) injection 10 mg    Or     prochlorperazine (COMPAZINE) tablet 10 mg    Or     prochlorperazine (COMPAZINE) Suppository 25 mg     metoclopramide (REGLAN) tablet 10 mg    Or     metoclopramide (REGLAN) injection 10 mg     oxyCODONE IR (ROXICODONE) tablet 10 mg     acetaminophen (TYLENOL) tablet 325 mg     verapamil (CALAN) tablet 40 mg     magnesium sulfate 2 g in NS intermittent infusion (PharMEDium or FV Cmpd)   .    Allergies: Not on File.    Family History: No family history on file..    Social History:   Social History   Substance Use Topics     Smoking status: Not on file     Smokeless tobacco: Not on file     Alcohol use Not on file            Objective   Temp:  [97.9  F (36.6  C)-98.1  F (36.7  C)] 98.1  F (36.7  C)  Heart Rate:  [53-75] 65  Resp:  [12-18] 14  BP: ()/(56-83) 105/60  SpO2:  [92 %-99 %] 94 %    No Data Recorded    Resp: 14    I/O last 3 completed shifts:  In: 915 [I.V.:915]  Out: -     Physical Exam  General: NAD, lying comfortably in bed  Constitutional: No distress.   HENT:   Head: Atraumatic.   Mouth/Throat: Oropharynx is clear and moist. No oropharyngeal exudate.   Cardiovascular: Normal heart sounds and intact distal pulses.    Pulmonary/Chest: Breath sounds normal. No respiratory distress.   Abdominal: Soft. Bowel sounds are normal. There is no tenderness.   Musculoskeletal: She exhibits no edema or tenderness.    Skin: Skin is warm. No rash noted. She is not diaphoretic.  Neurologic    Mental Status:       -- Awake; Alert; oriented x 3      -- Follows commands       -- Speech fluent, spontaneous. No  aphasia or dysarthria.      -- no gaze preference. No apparent hemineglect.    Cranial Nerves:      -- visual fields full to confrontation, PERRL 3-2mm bilat and brisk      -- extraocular movements intact      -- face symmetrical, tongue midline      -- sensory V1-V3 intact bilaterally      -- palate elevates symmetrically, uvula midline      -- hearing grossly intact bilat    Motor:    Delt Bi Tri WE WF    R 5 5 5 5 5 5   L 5 5 5 5 5 5    IP Quad Ham DF PF EHL   R 5 5 5 5 5 5   L 5 5 5 5 5 5     Sensory: symmetrically intact to light touch x4 extremities.     Reflexes: 2+ bilaterally and symmetric in biceps, triceps, brachioradialis, and patellae; no ankle clonus bilaterally; negative Babinski bilaterally; negative Mccoy's bilaterally      Labs and Imaging   BMP  Recent Labs  Lab 03/04/18  0101      POTASSIUM 3.5   CHLORIDE 109   CO2 24   BUN 10   CR 0.55   EMILIA 8.5       CBC  Recent Labs  Lab 03/04/18  0101   WBC 8.6   HGB 10.4*          COAGS  Recent Labs  Lab 03/04/18  0101   INR 0.98   PTT 30   FIBR 300       ABGNo results for input(s): PH, PCO2, PO2, HCO3 in the last 168 hours.    CRP/ESRNo results for input(s): CRP in the last 168 hours.    Invalid input(s): ESR    CSFNo results for input(s): CGLU, CTP in the last 168 hours.    Invalid input(s): CCSF    MICRONo results for input(s): CULT in the last 168 hours.    LIPID Profile: No results found for: CHOL  No results found for: HDL  No results found for: LDL  No results found for: TRIG  No results found for: CHOLHDLRATIO    IMAGING:   Reviewed

## 2018-03-05 NOTE — PLAN OF CARE
Problem: Patient Care Overview  Goal: Plan of Care/Patient Progress Review  Outcome: No Change  Pt is A/Ox4 with no deficits noted on Neuro exam. HR was SR-SB depending on if she is sleeping or not. Pt is independent and call light appropriate. VSS and Pt is on RA. Good UO when she goes. Pt on regular diet. Will continue to monitor and notify MD of changes.

## 2018-03-06 ENCOUNTER — OFFICE VISIT (OUTPATIENT)
Dept: INTERPRETER SERVICES | Facility: CLINIC | Age: 28
End: 2018-03-06
Payer: COMMERCIAL

## 2018-03-06 ENCOUNTER — APPOINTMENT (OUTPATIENT)
Dept: ULTRASOUND IMAGING | Facility: CLINIC | Age: 28
DRG: 064 | End: 2018-03-06
Attending: STUDENT IN AN ORGANIZED HEALTH CARE EDUCATION/TRAINING PROGRAM
Payer: COMMERCIAL

## 2018-03-06 VITALS
DIASTOLIC BLOOD PRESSURE: 77 MMHG | SYSTOLIC BLOOD PRESSURE: 117 MMHG | RESPIRATION RATE: 16 BRPM | OXYGEN SATURATION: 97 % | TEMPERATURE: 97.4 F | WEIGHT: 173.5 LBS

## 2018-03-06 LAB
ERYTHROCYTE [DISTWIDTH] IN BLOOD BY AUTOMATED COUNT: 16.2 % (ref 10–15)
GLUCOSE BLDC GLUCOMTR-MCNC: 93 MG/DL (ref 70–99)
HCT VFR BLD AUTO: 34.3 % (ref 35–47)
HGB BLD-MCNC: 10.5 G/DL (ref 11.7–15.7)
MCH RBC QN AUTO: 24.8 PG (ref 26.5–33)
MCHC RBC AUTO-ENTMCNC: 30.6 G/DL (ref 31.5–36.5)
MCV RBC AUTO: 81 FL (ref 78–100)
PLATELET # BLD AUTO: 267 10E9/L (ref 150–450)
RBC # BLD AUTO: 4.24 10E12/L (ref 3.8–5.2)
WBC # BLD AUTO: 7.8 10E9/L (ref 4–11)

## 2018-03-06 PROCEDURE — 25000132 ZZH RX MED GY IP 250 OP 250 PS 637: Performed by: STUDENT IN AN ORGANIZED HEALTH CARE EDUCATION/TRAINING PROGRAM

## 2018-03-06 PROCEDURE — 36415 COLL VENOUS BLD VENIPUNCTURE: CPT | Performed by: INTERNAL MEDICINE

## 2018-03-06 PROCEDURE — 85027 COMPLETE CBC AUTOMATED: CPT | Performed by: INTERNAL MEDICINE

## 2018-03-06 PROCEDURE — 93886 INTRACRANIAL COMPLETE STUDY: CPT

## 2018-03-06 PROCEDURE — 00000146 ZZHCL STATISTIC GLUCOSE BY METER IP

## 2018-03-06 PROCEDURE — T1013 SIGN LANG/ORAL INTERPRETER: HCPCS | Mod: U3

## 2018-03-06 RX ORDER — ATORVASTATIN CALCIUM 20 MG/1
20 TABLET, FILM COATED ORAL DAILY
Status: DISCONTINUED | OUTPATIENT
Start: 2018-03-06 | End: 2018-03-06 | Stop reason: HOSPADM

## 2018-03-06 RX ORDER — ATORVASTATIN CALCIUM 20 MG/1
20 TABLET, FILM COATED ORAL DAILY
Qty: 30 TABLET | Refills: 11 | Status: SHIPPED | OUTPATIENT
Start: 2018-03-06 | End: 2022-10-14

## 2018-03-06 RX ORDER — ASPIRIN 81 MG/1
81 TABLET, CHEWABLE ORAL DAILY
Qty: 30 TABLET | Refills: 11 | Status: SHIPPED | OUTPATIENT
Start: 2018-03-07

## 2018-03-06 RX ADMIN — ASPIRIN 81 MG CHEWABLE TABLET 81 MG: 81 TABLET CHEWABLE at 08:40

## 2018-03-06 RX ADMIN — ATORVASTATIN CALCIUM 20 MG: 20 TABLET, FILM COATED ORAL at 11:48

## 2018-03-06 ASSESSMENT — VISUAL ACUITY
OU: GLASSES;BASELINE
OU: NORMAL ACUITY;GLASSES

## 2018-03-06 NOTE — PLAN OF CARE
Problem: Patient Care Overview  Goal: Plan of Care/Patient Progress Review  VSS. A&Ox4. Neuros intact. Denies pain. Voiding spontaneously. PIV SL'd. Up independently. Reg diet. Please offer warm water, pitcher of hot water with a cup of room temp water & extra cup for them to mix ([facility] cultural preference). On CCM. Cont to monitor and with POC.

## 2018-03-06 NOTE — PLAN OF CARE
Problem: Patient Care Overview  Goal: Plan of Care/Patient Progress Review  Outcome: No Change  Pt transferred from  this afternoon. Admitted on 3/3/2018 with left frontal SAH found to have intracranial stenosis of L M1 segement of MCA by cerebral angiogram. VSS. A&Ox4. Neuros intact. On continuous cardiac monitoring, NSR. On regular diet and tolerating well. PIV SL. Up independently. Voiding spontaneously via bathroom. Uses call light appropriately. Will continue to monitor and follow POC.

## 2018-03-06 NOTE — PLAN OF CARE
Problem: Stroke (Hemorrhagic) (Adult)  Goal: Signs and Symptoms of Listed Potential Problems Will be Absent, Minimized or Managed (Stroke)  Signs and symptoms of listed potential problems will be absent, minimized or managed by discharge/transition of care (reference Stroke (Hemorrhagic) (Adult) CPG).   Outcome: Adequate for Discharge Date Met: 03/06/18  Pt admitted on 3/3/2018 with left frontal SAH found to have intracranial stenosis of L M1 segement of MCA by cerebral angiogram. VSS. Denies pain. Neuros intact. R angio groin site w/ tegaderm, CDI, bruising present. Cranial ultrasound completed today. Up independently. Regular diet. Voiding spontaneously. PIV removed. Discharge instructions given to patient and family at bedside. Handouts given on Atorvastatin and low-fat diet. Pt ambulated to pharmacy and front door.

## 2018-03-06 NOTE — DISCHARGE SUMMARY
[facility], [place]    Neurology Stroke Discharge Summary    Date of Admission: 3/3/2018  Date of Discharge: 03/06/2018    Disposition: Discharged to home  Primary Care Physician: System, Provider Not In      Admission Diagnosis:   SAH    Discharge Diagnosis:   Subarachnoid Hemorrhage   L M1 Intracranial Stenosis    Problem Leading to Hospitalization (from HPI):   Per H&P on 3/4:   On the morning of 3/3/2018 at approximately 1000  the patient had a sudden onset of a left sided headache immediately preceding a left V2-3 distribution of numbness for approximately 10-15 minutes, during which time she had some difficulty with her speech that she describes as difficulty producing the words. Immediately following this she had a generalized feeling of weakness. After the episode she returned to her neurologic baseline and the headache subsided. She was then taken to an OSH ED where a CTA and MRI were performed which demonstrated a left frontal subacute subarachnoid hemorrhage. Neurosurgery at OSH was consulted where the working differential included reversible cerebral vasoconstriction syndrome. Due to the lack of beds at OSH transfer to [facility] was arranged.     No recent fevers, chills, weight change, chest pain, shortness of breath, cough, abdominal pain, nausea, vomiting, and syncope. The patient also denies headaches, double vision, blurry vision, weakness, LOC, changes in sensation, taste, smell, bowel or bladder incontinence, or other neurologic symptoms    Please see H&P dated 3/4/2018 for further details about presentation.    Brief Hospital Course:   Patient presented with sudden onset L sided headache + V2-V3 distribution numbness, aphasia followed by weakness (sxs resolved shortly after onset), found to have L frontal subacute subarachnoid hemorrhage at OSH,  transferred for further evaluation. At baseline upon transfer, Evaluated by Neurosurgery + Neurocrit; no neurosurgical  intervention indicated, observed in ICU for 2 days. F/u angiogram was negative for aneurysmal bleed/venous thrombosis but incidentally found to have severe stenosis of L M1 w/ collateral pattern , verapamil injected w/o change in stenosis; no stenting/ other intervention. This finding (and clinical picture) not clearly consistent w/ RCVS, moyamoya or atherosclerotic narrowing; stenosis likely chronic and thought to be unrelated to presentation. TCD's w/ elevated velocities in L MCA + L TJ on presentation, f/u TCDs persistently elevated mean velocities in L MCA but stable and expected to be higher due to level of stenosis. Initially treated as RCVS w/ verapamil + Mg x 1d, tx d/c'd following further evaluation and clinical picture not indicative of RCVS. Started ASA 81mg daily and atorvastatin 20mg daily for Lt M1 stenosis; will need to discuss DAP at Stroke/Neurology f/u (started only on aspirin during this hospitalization given SAH). Patient remained asx, exam stable/ baseline/nonfocal.     Etiology:  - SAH: unknown, no exertional/precipitating factors; does have family hx of SAH in an aunt at age 30 but this was reportedly aneurysmal.   - L M1 stenosis: unknown, appears chronic given developed collateral supply    Further Work-up as stated below under Pertinent Investigations.    Rehab evaluation: not required - patient is independent, asx at baseline w/o deficit.     Smoking Cessation: patient is not a smoker    BP Long-term Goal: 140/90 or less    Antithrombotic/Anticoagulant Agent: aspirin 81 mg, consider dual antiplatelet in the future.     Statins: Started on atorvastatin 20mg       Hgb A1C Goal: < 7.0    Complications: None.     Other problems addressed during this hospitalization:  n/a    PERTINENT INVESTIGATIONS    Labs  Lipid Panel:   Recent Labs   Lab Test  03/05/18   0745   CHOL  161   HDL  46*   LDL  99   TRIG  79     A1C:   Lab Results   Component Value Date    A1C 5.2 03/05/2018     INR:     Recent  Labs  Lab 03/05/18  0745 03/04/18  0101   INR 1.05 0.98      Coag Panel / Hypercoag Workup: Not indicated  Pending test results: n/a    Echo: TTE  No cardiac source for embolus identified. The atrial septum is intact as assessed by color Doppler and agitated dextrose bubble study.    Imaging:   OSH CT/CTA/MRI :  L frontal subacute SAH    Endovascular procedure:  IR Carotid Cerebral Angiogram b/l  1. Severe stenosis of the left M1 middle cerebral artery immediately  distal to the origin. There is slowed flow in the perirolandic  branches of the left middle cerebral artery. 15 mg of intra-arterial  verapamil was infused into the left internal carotid artery without  any change in the control runoff. Differentials include reversible  cerebral vasoconstriction syndrome, Moyamoya disease or  atherosclerotic narrowing. However the location and the collateral  pattern does not clearly fit into any of these pictures. We will  continue work-up with our vascular neurology colleagues before  committing to any intervention or definite management plan.  2. Pial collaterals to the left MCA territory from the right anterior  cerebral artery and left posterior cerebral artery.    Cardiac Monitoring: Patient had > 24 hrs of cardiac monitor while in hospital.    Findings: No atrial fibrillation was found.    Sleep Apnea Screen:   Questions/Answers      1. Prior to your stroke, have you been told that you snore? Yes.    2. Prior to your stroke, have you been told that you struggle to breath while you are sleeping? No.    3. Prior to your stroke, do you feel tired and sleepy even after getting a normal night of sleep? Yes.    Sleep Apnea Screen Findings: Patient has 2 or more symptoms of sleep apnea.  Outpatient sleep study is recommended. Patient did not have a stroke so typical stroke assessment may not be warranted but the team recommended that she have a sleep evaluation to assess for obstructive sleep apnea but she declined at  this time; it was recommended that she follow up with her primary care provider and / or stroke neurologist regarding further evaluation.    Education discussed with: patient and family on follow-up recommendations/plan.    During daily rounds, the plan of care was discussed and developed with patient and family.  Plan of care includes: see below.  - Start atorvastatin 20mg qday + asa 81mg qday; discuss w/ Neurology at next appointment regarding initiation of DAP therapy  - F/u w/ primary care in ~7d to discuss medications + recent hospitalization  - F/u w/ Stroke Neurology at next available appointment; f/u CTA Head/neck ordered to be completed at f/u      PHYSICAL EXAMINATION  Vital Signs:  B/P: 117/77, T: 97.4, P: Data Unavailable, R: 16    General:  patient lying in bed without any acute distress     HEENT:  normocephalic/atraumatic  Cardio:  RRR  Pulmonary:  no respiratory distress  Abdomen:  soft, non-tender, non-distended, bowel sounds present  Extremities:  no edema  Skin:  intact, warm/dry     Neurologic  Mental Status:  fully alert, attentive and oriented, follows commands, speech clear and fluent  Cranial Nerves:  visual fields intact, PERRL, EOMI with normal smooth pursuit, facial sensation intact and symmetric, facial movements symmetric, hearing not formally tested but intact to conversation, palate elevation symmetric and uvula midline, no dysarthria, shoulder shrug strong bilaterally, tongue protrusion midline  Motor:  no abnormal movements, normal tone throughout, normal muscle bulk, no pronator drift, normal and symmetric rapid finger tapping, able to move all limbs spontaneously, strength 5/5 throughout upper and lower extremities  Reflexes:  2+ and symmetric throughout  Sensory:  intact/symmetric to light touch and pin prick throughout upper and lower extremities  Coordination:  FNF and HS intact without dysmetria  Station/Gait:  steady gait + balance     Modified Doddridge Scale (on day of  discharge): 0-No symptoms    Medications    Current Discharge Medication List      START taking these medications    Details   aspirin 81 MG chewable tablet Take 1 tablet (81 mg) by mouth daily  Qty: 30 tablet, Refills: 11    Associated Diagnoses: Middle cerebral artery stenosis, left      atorvastatin (LIPITOR) 20 MG tablet Take 1 tablet (20 mg) by mouth daily  Qty: 30 tablet, Refills: 11    Associated Diagnoses: Middle cerebral artery stenosis, left             Additional recommendations and follow up:      CT Angiogram head neck w & w/o contrast   F/u on Lt M1 stenosis     Reason for your hospital stay   Subarachnoid Hemorrhage, found to have a Left middle cerebral artery stenosis.     Adult [facility] Follow-up and recommended labs and tests   Follow up with primary care provider within 7 days to evaluate medication change and for hospital follow- up.  No follow up labs or test are needed.    Follow-up with Stroke Clinic in 2-3 months or at next available appointment. Follow up with an imaging study (Head CTA) prior to appointment, results as well as medication management to be discussed at appointment. You will be contacted to schedule a Stroke Clinic appointment. If you have not been contacted to schedule a stroke follow-up appointment within 7 days of discharge, please contact Stroke Neurology Clinic at [phone], pick option #1.   If you have other stroke related questions, please call [phone], pick option #3, and ask to speak to Khai Liu RN Stroke/Endovascular Care Coordinator.  If you have any urgent stroke related questions after hours, please contact the hospital  at [phone] and ask to be connected to a stroke provider.        Appointments on [place] and/or [facility] (with [facility] or [facility] provider or service). Call [phone] if you haven't heard regarding these appointments within 7 days of discharge.     Activity   Your activity upon discharge: activity as tolerated      When to contact your care team   Call 911 and return to the Emergency Room if you have any of the following:  - sudden onset excruciating headache, weakness, numbness, tingling, vertigo/lightheadedness, dizziness, vision or hearing changes or other concerning symptom.     Discharge Instructions   1. Follow up with your Primary Care Provider and Stroke Clinic as described above.   2. Begin taking your prescribed medications (Aspirin and Atorvastatin).  3. Discuss with your Primary Care Provider and/or Stroke Neurologist if you would benefit from a sleep study to assess for obstructive sleep apnea.     Full Code     Diet   Follow this diet upon discharge: Orders Placed This Encounter     Advance Diet as Tolerated: Regular Diet Adult       Patient was seen and discussed with the Attending, Dr. Aura Paniagua.    Cain Lewis  Pager: [phone]

## 2018-03-09 ENCOUNTER — CARE COORDINATION (OUTPATIENT)
Dept: NEUROLOGY | Facility: CLINIC | Age: 28
End: 2018-03-09

## 2018-03-09 NOTE — PROGRESS NOTES
Stroke Center Discharge Coordination Note     Responsible Attending physician: Dr. Paniagua     Operation performed: Diagnostic cerebral angiogram      Date of Discharge: 3/6/18     Discharge Diagnosis:   Subarachnoid Hemorrhage   L M1 Intracranial Stenosis    Discharge to: Home    Current Status:                  New s/s stroke; Trouble w/speech,thinking,processing:  No                               Incision site:  Denies bleeding, drainage, pain, swelling, redness at groin puncture site. Has some bruising.                                Diabetic:  No                               Pain Management:  No     Receiving Therapy;Where:  No    Falls:  No    Driving;Working:  Yes; Yes                 ADL's:  Independent                                General: Patient states she is feeling much better. Now that she is home doing daily activities she is feeling much better. Still has some split second pain on left side of head, temporal area when she moves too quickly. Denies headache.     What new medications did you start in the hospital and how are you taking those? (compare this to AVS to confirm patient taking correctly) started aspirin and atorvastatin. Taking medications as prescribed.   Current Outpatient Prescriptions   Medication Sig Dispense Refill     aspirin 81 MG chewable tablet Take 1 tablet (81 mg) by mouth daily 30 tablet 11     atorvastatin (LIPITOR) 20 MG tablet Take 1 tablet (20 mg) by mouth daily 30 tablet 11       Questions: none currently. Contact information provided and encouraged to call with questions/concerns.    Follow up plan:   - Start atorvastatin 20mg qday + asa 81mg qday; discuss w/ Neurology at next appointment regarding initiation of DAP therapy  - F/u w/ primary care in ~7d to discuss medications + recent hospitalization. Will schedule.   - F/u w/ Stroke Neurology at next available appointment; f/u CTA Head/neck ordered to be completed at f/u. Appointment with Dr. Hunter and CTA 5/8/18

## 2018-05-07 NOTE — TELEPHONE ENCOUNTER
FUTURE VISIT INFORMATION      FUTURE VISIT INFORMATION:    Date: 05/08/2018     Time:     Location:   REFERRAL INFORMATION:    Referring provider:  Jose Hunter MD    Referring providers clinic:      Reason for visit/diagnosis        NOTES (FOR ALL VISITS) STATUS DETAILS   OFFICE NOTE from referring provider Internal    OFFICE NOTE from other specialist Internal    DISCHARGE SUMMARY from hospital Internal 03/03/2018   DISCHARGE REPORT from the ER Internal 03/03/2018   OPERATIVE REPORT N/A    MEDICATION LIST Internal and Care Everywhere     IMAGING  (FOR ALL VISITS)     EMG N/A    EEG N/A    ECT N/A    MRI (HEAD, NECK, SPINE) Care Everywhere 03/03/2018   CT (HEAD, NECK, SPINE) Care Everywhere 03/03/2018

## 2018-05-08 ENCOUNTER — PRE VISIT (OUTPATIENT)
Dept: NEUROLOGY | Facility: CLINIC | Age: 28
End: 2018-05-08

## 2018-05-08 ENCOUNTER — OFFICE VISIT (OUTPATIENT)
Dept: NEUROLOGY | Facility: CLINIC | Age: 28
End: 2018-05-08
Payer: COMMERCIAL

## 2018-05-08 ENCOUNTER — RADIANT APPOINTMENT (OUTPATIENT)
Dept: CT IMAGING | Facility: CLINIC | Age: 28
End: 2018-05-08
Attending: STUDENT IN AN ORGANIZED HEALTH CARE EDUCATION/TRAINING PROGRAM
Payer: COMMERCIAL

## 2018-05-08 VITALS
BODY MASS INDEX: 32.57 KG/M2 | SYSTOLIC BLOOD PRESSURE: 120 MMHG | HEIGHT: 62 IN | DIASTOLIC BLOOD PRESSURE: 68 MMHG | HEART RATE: 61 BPM | WEIGHT: 177 LBS

## 2018-05-08 DIAGNOSIS — Z86.73 HISTORY OF CVA (CEREBROVASCULAR ACCIDENT): ICD-10-CM

## 2018-05-08 DIAGNOSIS — I60.9 SUBARACHNOID HEMORRHAGE (H): ICD-10-CM

## 2018-05-08 DIAGNOSIS — I66.02 MIDDLE CEREBRAL ARTERY STENOSIS, LEFT: ICD-10-CM

## 2018-05-08 DIAGNOSIS — Z86.73 HISTORY OF CVA (CEREBROVASCULAR ACCIDENT): Primary | ICD-10-CM

## 2018-05-08 LAB
ALBUMIN SERPL-MCNC: 3.8 G/DL (ref 3.4–5)
ALP SERPL-CCNC: 59 U/L (ref 40–150)
ALT SERPL W P-5'-P-CCNC: 28 U/L (ref 0–50)
AST SERPL W P-5'-P-CCNC: 18 U/L (ref 0–45)
BASOPHILS # BLD AUTO: 0 10E9/L (ref 0–0.2)
BASOPHILS NFR BLD AUTO: 0.4 %
BILIRUB DIRECT SERPL-MCNC: <0.1 MG/DL (ref 0–0.2)
BILIRUB SERPL-MCNC: 0.3 MG/DL (ref 0.2–1.3)
CRP SERPL-MCNC: <2.9 MG/L (ref 0–8)
DIFFERENTIAL METHOD BLD: ABNORMAL
EOSINOPHIL # BLD AUTO: 0.1 10E9/L (ref 0–0.7)
EOSINOPHIL NFR BLD AUTO: 1.7 %
ERYTHROCYTE [DISTWIDTH] IN BLOOD BY AUTOMATED COUNT: 15.8 % (ref 10–15)
ERYTHROCYTE [SEDIMENTATION RATE] IN BLOOD BY WESTERGREN METHOD: 20 MM/H (ref 0–20)
HCT VFR BLD AUTO: 38.6 % (ref 35–47)
HGB BLD-MCNC: 12.1 G/DL (ref 11.7–15.7)
IMM GRANULOCYTES # BLD: 0 10E9/L (ref 0–0.4)
IMM GRANULOCYTES NFR BLD: 0.2 %
LYMPHOCYTES # BLD AUTO: 2.3 10E9/L (ref 0.8–5.3)
LYMPHOCYTES NFR BLD AUTO: 44.4 %
MCH RBC QN AUTO: 24.9 PG (ref 26.5–33)
MCHC RBC AUTO-ENTMCNC: 31.3 G/DL (ref 31.5–36.5)
MCV RBC AUTO: 80 FL (ref 78–100)
MONOCYTES # BLD AUTO: 0.4 10E9/L (ref 0–1.3)
MONOCYTES NFR BLD AUTO: 6.7 %
NEUTROPHILS # BLD AUTO: 2.4 10E9/L (ref 1.6–8.3)
NEUTROPHILS NFR BLD AUTO: 46.6 %
NRBC # BLD AUTO: 0 10*3/UL
NRBC BLD AUTO-RTO: 0 /100
PLATELET # BLD AUTO: 323 10E9/L (ref 150–450)
PROT SERPL-MCNC: 8 G/DL (ref 6.8–8.8)
RBC # BLD AUTO: 4.85 10E12/L (ref 3.8–5.2)
WBC # BLD AUTO: 5.2 10E9/L (ref 4–11)

## 2018-05-08 RX ORDER — IOPAMIDOL 755 MG/ML
75 INJECTION, SOLUTION INTRAVASCULAR ONCE
Status: COMPLETED | OUTPATIENT
Start: 2018-05-08 | End: 2018-05-08

## 2018-05-08 RX ADMIN — IOPAMIDOL 75 ML: 755 INJECTION, SOLUTION INTRAVASCULAR at 09:20

## 2018-05-08 ASSESSMENT — PAIN SCALES - GENERAL: PAINLEVEL: NO PAIN (0)

## 2018-05-08 NOTE — DISCHARGE INSTRUCTIONS

## 2018-05-08 NOTE — MR AVS SNAPSHOT
After Visit Summary   5/8/2018    Aliya Rios    MRN: [id number]           Patient Information     Date Of Birth          1990        Visit Information        Provider Department      5/8/2018 10:00 AM Jose Hunter MD [facility] Neurology        Today's Diagnoses     History of CVA (cerebrovascular accident)    -  1       Follow-ups after your visit        Follow-up notes from your care team     Return in about 4 weeks (around 6/5/2018).      Your next 10 appointments already scheduled     May 08, 2018 11:00 AM CDT   LAB with  LAB   [facility] ([facility])    [address] [place] [id number] [phone]           Please do not eat 10-12 hours before your appointment if you are coming in fasting for labs on lipids, cholesterol, or glucose (sugar). This does not apply to pregnant women. Water, hot tea and black coffee (with nothing added) are okay. Do not drink other fluids, diet soda or chew gum.            Jun 14, 2018  2:30 PM CDT   (Arrive by 2:15 PM)   Return Visit with Jose Hunter MD   [facility] Neurology ([facility])    [address] [place] [id number] [phone]              Future tests that were ordered for you today     Open Future Orders        Priority Expected Expires Ordered    CBC with platelets differential Routine  5/8/2019 5/8/2018    Erythrocyte sedimentation rate auto Routine  5/8/2019 5/8/2018    CRP inflammation Routine  5/8/2019 5/8/2018    Factor 5 leiden mutation analysis Routine  5/8/2019 5/8/2018    Protein S Antigen Free Routine  5/8/2019 5/8/2018    Homocysteine Routine  5/8/2019 5/8/2018    Protein C chromogenic Routine  5/8/2019 5/8/2018    Hepatic panel Routine  5/8/2019 5/8/2018            Who to contact     Please call your clinic at [phone] to:    Ask questions about your health    Make or cancel appointments    Discuss your medicines    Learn  "about your test results    Speak to your doctor            Additional Information About Your Visit        MyChart Information     [employer] is an electronic gateway that provides easy, online access to your medical records. With [employer], you can request a clinic appointment, read your test results, renew a prescription or communicate with your care team.     To sign up for [employer] visit the website at [email or web address]   You will be asked to enter the access code listed below, as well as some personal information. Please follow the directions to create your username and password.     Your access code is: [email or web address]  Expires: 2018 12:59 PM     Your access code will  in 90 days. If you need help or a new code, please contact your [facility] or call [phone] for assistance.        Care EveryWhere ID     This is your Care EveryWhere ID. This could be used by other organizations to access your [place] medical records  [id number]        Your Vitals Were     Pulse Height BMI (Body Mass Index)             61 1.575 m (5' 2\") 32.37 kg/m2          Blood Pressure from Last 3 Encounters:   18 120/68   18 117/77    Weight from Last 3 Encounters:   18 80.3 kg (177 lb)   18 78.7 kg (173 lb 8 oz)               Primary Care Provider Fax #    Provider Not In System [phone]                Equal Access to Services     [facility]: Hadii aad ku hadasho Soomaali, waaxda luqadaha, qaybta kaalmada adeegyada, alec jones . So [facility] [phone].    ATENCIÓN: Si habla español, tiene a mancuso disposición servicios gratuitos de asistencia lingüística. Llame al [phone].    We comply with applicable federal civil rights laws and [place] laws. We do not discriminate on the basis of race, color, national origin, age, disability, sex, sexual orientation, or gender identity.            Thank you!     Thank you for choosing [facility] " NEUROLOGY  for your care. Our goal is always to provide you with excellent care. Hearing back from our patients is one way we can continue to improve our services. Please take a few minutes to complete the written survey that you may receive in the mail after your visit with us. Thank you!             Your Updated Medication List - Protect others around you: Learn how to safely use, store and throw away your medicines at www.disposemymeds.org.          This list is accurate as of 5/8/18 10:52 AM.  Always use your most recent med list.                   Brand Name Dispense Instructions for use Diagnosis    aspirin 81 MG chewable tablet     30 tablet    Take 1 tablet (81 mg) by mouth daily    Middle cerebral artery stenosis, left       atorvastatin 20 MG tablet    LIPITOR    30 tablet    Take 1 tablet (20 mg) by mouth daily    Middle cerebral artery stenosis, left

## 2018-05-08 NOTE — LETTER
2018       RE: Aliya Rios  [address] [place] [address]     Dear Colleague,    Thank you for referring your patient, Aliya Rios, to the [facility] NEUROLOGY at [facility]. Please see a copy of my visit note below.    Service Date: 2018      May 8, 2018        Aura Paniagua MD   On license of [facility]    [address]   [place] [address]      RE: Aliya Rios   MRN: [id number]    : 1990          Dear Dr. Paniagua:        This patient was seen by you in the hospital and came to the Outpatient General Neurology Clinic for followup of issues raised.      We will repeat the history for completion sake.      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      She does not have any history of car sickness in childhood.  She described these headaches are usually stress related, although they do not involve the neck.  However, on  she had to be admitted to the hospital as she had woke up that day and did have a very severe headache on the left side of her head, she says the worst of her life and she did come to the emergency room and also noticed that she could not get the words she wanted to say out and there was some numbness in the left side of her face in V2 and V3 with this headache, which was quite intense and severe.      The inability to speak lasted 15-20 minutes.  When she arrived she was studied immediately in the emergency room here and admitted.  She was found to have subarachnoid hemorrhage localized to the left temporal area and also was found to have a high grade stenosis of the left middle cerebral in the first segment.  I believe she had an injection of vasodilator in the artery.  The headache and the speech problem gradually  resolved.  She did have some workup for the middle cerebral artery stenosis and did not have any other vascular occlusive disease.  No signs of Takayasu's or anything on clinical exam.      She did have a transcranial Doppler done and a complete echo with bubble study was also performed on 03/03 and there were no abnormalities of potential source of embolus.  The last intracranial Doppler examination on 03/03 did show persistent artery vasospasm by velocity criteria consistent with severe vasospasm.  She does not have any history of hypertension or any vascular disease to explain the cerebral vasoconstriction syndrome that she had.      Workup did not show any vasculitis predisposition due to history.  She was discharged with symptoms cleared up and they did have a CT angiogram.  Results are not available.        Family history indicates there is no history of strokes in young adults.  There is no history of tremor, in no bleeding history.      Her workup was noted.  Her LDL was elevated at 11 and she was started on lipitor 20 plus 81 mg of aspirin which she has been taking.  She has had no recurrence of symptoms.      SOCIAL HISTORY:  Her boyfriend came with her.  She has no drug history and no cocaine or any vasoconstrictive drugs.  Amphetamine are also denied.        REVIEW OF SYSTEMS:  As above.      PHYSICAL EXAMINATION:  She is very pleasant, very fun woman in English.  Blood pressure is 120/68, pulse is 61.  Her cranium is not tender on the left side.  Her fundus is normal.  There are normal extraocular movements.  Visual fields are preserved.  No facial asymmetry.  No bruits in the supraclavicular or the carotid arteries.  No cardiac murmur.  Her motor exam is completely normal.  Coordination was totally normal.  Reflexes are hypoactive, but obtainable.  Sensory exam is normal.  Gait and station unremarkable.        In summary, patient has no findings.  The etiology of the bleed and the vasoconstrictive  syndrome that caused of the stenosis of the branch of the middle cerebral artery is not clear.  Her CTA today results are not available.  We will proceed to complete a workup for coagulopathy and ordered the usual standard profile.  She is to remain on the aspirin and the lipid lowering drugs.  She is told to come to the emergency room if she should have recurrence of these headaches.  She does not have migraines by history.  There is no cardiac embolism.      We will present a case and discuss it on a Tuesday conference whenever possible.  In the meantime the patient is to remain on aspirin.  We will discuss with you shortly.        D: 2018   T: 2018   MT: gloria      Name:     ROGELIO MORSE   MRN:      [id number]        Account:      [id number]   :      1990           Service Date: 2018      Document: [id number]      Again, thank you for allowing me to participate in the care of your patient.      Sincerely,    Jose Hunter MD

## 2018-05-08 NOTE — PROGRESS NOTES
Service Date: 2018      May 8, 2018        Auar Paniagua MD   On license of [facility]    [address], [facility] [address]   [place] [address]      RE: Aliya Rios   MRN: [id number]    : 1990              Dear Dr. Paniagua:        This patient was seen by you in the hospital and came to the [facility]patient [facility] for followup of issues raised.      We will repeat the history for completion sake.      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      She does not have any history of car sickness in childhood.  She described these headaches are usually stress related, although they do not involve the neck.  However, on  she had to be admitted to the hospital as she had woke up that day and did have a very severe headache on the left side of her head, she says the worst of her life and she did come to the emergency room and also noticed that she could not get the words she wanted to say out and there was some numbness in the left side of her face in V2 and V3 with this headache, which was quite intense and severe.      The inability to speak lasted 15-20 minutes.  When she arrived she was studied immediately in the emergency room here and admitted.  She was found to have subarachnoid hemorrhage localized to the left temporal area and also was found to have a high grade stenosis of the left middle cerebral in the first segment.  I believe she had an injection of vasodilator in the artery.  The headache and the speech problem gradually resolved.  She did have some workup for the middle cerebral artery stenosis and did not have any other vascular occlusive disease.  No signs of Takayasu's or anything on clinical exam.      She did have a transcranial Doppler done and a complete echo with bubble  study was also performed on 03/03 and there were no abnormalities of potential source of embolus.  The last intracranial Doppler examination on 03/03 did show persistent artery vasospasm by velocity criteria consistent with severe vasospasm.  She does not have any history of hypertension or any vascular disease to explain the cerebral vasoconstriction syndrome that she had.      Workup did not show any vasculitis predisposition due to history.  She was discharged with symptoms cleared up and they did have a CT angiogram.  Results are not available.        Family history indicates there is no history of strokes in young adults.  There is no history of tremor, in no bleeding history.      Her workup was noted.  Her LDL was elevated at 11 and she was started on lipitor 20 plus 81 mg of aspirin which she has been taking.  She has had no recurrence of symptoms.      SOCIAL HISTORY:  Her boyfriend came with her.  She has no drug history and no cocaine or any vasoconstrictive drugs.  Amphetamine are also denied.        REVIEW OF SYSTEMS:  As above.      PHYSICAL EXAMINATION:  She is very pleasant, very fun woman in English.  Blood pressure is 120/68, pulse is 61.  Her cranium is not tender on the left side.  Her fundus is normal.  There are normal extraocular movements.  Visual fields are preserved.  No facial asymmetry.  No bruits in the supraclavicular or the carotid arteries.  No cardiac murmur.  Her motor exam is completely normal.  Coordination was totally normal.  Reflexes are hypoactive, but obtainable.  Sensory exam is normal.  Gait and station unremarkable.        In summary, patient has no findings.  The etiology of the bleed and the vasoconstrictive syndrome that caused of the stenosis of the branch of the middle cerebral artery is not clear.  Her CTA today results are not available.  We will proceed to complete a workup for coagulopathy and ordered the usual standard profile.  She is to remain on the aspirin  and the lipid lowering drugs.  She is told to come to the emergency room if she should have recurrence of these headaches.  She does not have migraines by history.  There is no cardiac embolism.      We will present a case and discuss it on a Tuesday conference whenever possible.  In the meantime the patient is to remain on aspirin.  We will discuss with you shortly.        Sincerely,        MD ROSARIO Srinivasan MD             D: 2018   T: 2018   MT: gloria      Name:     ROGELIO MORSE   MRN:      [id number]        Account:      [id number]   :      1990           Service Date: 2018      Document: [id number]

## 2018-05-09 LAB
HCYS SERPL-SCNC: 5 UMOL/L (ref 4–12)
PROT C ACT/NOR PPP CHRO: 144 % (ref 70–170)
PROT S FREE AG ACT/NOR PPP IA: 95 % (ref 55–125)

## 2018-05-10 LAB — COPATH REPORT: NORMAL

## 2018-06-14 ENCOUNTER — OFFICE VISIT (OUTPATIENT)
Dept: NEUROLOGY | Facility: CLINIC | Age: 28
End: 2018-06-14
Payer: COMMERCIAL

## 2018-06-14 VITALS
TEMPERATURE: 97.9 F | HEIGHT: 62 IN | WEIGHT: 180.1 LBS | DIASTOLIC BLOOD PRESSURE: 77 MMHG | BODY MASS INDEX: 33.14 KG/M2 | SYSTOLIC BLOOD PRESSURE: 119 MMHG | HEART RATE: 59 BPM | OXYGEN SATURATION: 98 % | RESPIRATION RATE: 24 BRPM

## 2018-06-14 DIAGNOSIS — I63.512 CEREBROVASCULAR ACCIDENT (CVA) DUE TO STENOSIS OF LEFT MIDDLE CEREBRAL ARTERY (H): Primary | ICD-10-CM

## 2018-06-14 PROBLEM — G43.909 MIGRAINES: Status: ACTIVE | Noted: 2018-06-14

## 2018-06-14 ASSESSMENT — PAIN SCALES - GENERAL: PAINLEVEL: NO PAIN (0)

## 2018-06-14 NOTE — LETTER
2018       RE: Aliya Rios  [address] [place] [address]     Dear Colleague,    Thank you for referring your patient, Aliya Rios, to the [facility] NEUROLOGY at [facility]. Please see a copy of my visit note below.    Service Date: 2018      Philip Matias MD   [facility]   [address]   [place]  [address]       RE: Aliya Rios   MRN: [id number]   : 1990      Dear Dr. Matias:      Mrs. Aliya Rios is 28 years old and she just recently got .  I saw her on , and reference is made to my note.  She had study at that time following up her hospitalization for a TIA and a possible vasoconstrictive syndrome with occlusion of branch of the left MCA showed that the stenosis had continued to increase in the left middle cerebral artery branch.  She had been treated with vasodilation at the time it acutely happened.  This is all in my note.  She had seen Dr. Paniagua in the hospital.      I discussed the case with Dr. Donell Saavedra, and he will be seeing her in October.  In the meantime, she returns today and has had no further episodes of speech problem, no headaches, which she had at that time, or anything else.  She, however, has not been taking the aspirin as recommended or the lipid-lowering agent.      On exam today her blood pressure is 119/77, pulse 59.  Her speech is very fluent.  She shows no deficit.  We spoke and we asked her to take the aspirin regularly, also stay on the Lipitor, and we will repeat the CTA in 3 months, shortly before she sees Dr. Saavedra again.  Dr. Saavedra thought that perhaps it could be some sort of focal vasculitic process.  Her workup has included all the coagulation parameters, echo of the heart with bubble and everything has been normal.  We will see her in 3 months.      Sincerely,      Jose Hunter MD          D: 2018   T: 06/15/2018   MT: LIZETTE      Name:     ALIYA RIOS   MRN:       [id number]        Account:      [id number]   :      1990           Service Date: 2018      Document: [id number]

## 2018-06-14 NOTE — NURSING NOTE
Chief Complaint   Patient presents with     RECHECK     [facility]- HEADACHES,4 WEEKS F/U     Michael Zheng, CMA

## 2018-06-14 NOTE — MR AVS SNAPSHOT
After Visit Summary   6/14/2018    Aliya Rios    MRN: [id number]           Patient Information     Date Of Birth          1990        Visit Information        Provider Department      6/14/2018 2:30 PM Jose Hunter MD [facility] Neurology        Today's Diagnoses     Cerebrovascular accident (CVA) due to stenosis of left middle cerebral artery (H)    -  1       Follow-ups after your visit        Follow-up notes from your care team     Return in about 3 months (around 9/14/2018).      Your next 10 appointments already scheduled     Sep 17, 2018 12:30 PM CDT   (Arrive by 12:15 PM)   Return Visit with Jose Hunter MD   [facility] Neurology ([facility])    [address] [place] [id number] [phone]            Oct 09, 2018  9:00 AM CDT   (Arrive by 8:45 AM)   New Stroke with Trevon Saavedra MD   [facility] ([facility])    [address] [place] [address] [phone]              Future tests that were ordered for you today     Open Future Orders        Priority Expected Expires Ordered    CT Angiogram head w & w/o contrast Routine  6/14/2019 6/14/2018            Who to contact     Please call your clinic at [phone] to:    Ask questions about your health    Make or cancel appointments    Discuss your medicines    Learn about your test results    Speak to your doctor            Additional Information About Your Visit        MyChart Information     Blazet is an electronic gateway that provides easy, online access to your medical records. With Whooch, you can request a clinic appointment, read your test results, renew a prescription or communicate with your care team.     To sign up for Blazet visit the website at [email or web address]   You will be asked to enter the access code listed below, as well as some personal information. Please follow the directions to  "create your username and password.     Your access code is: [email or web address]  Expires: 2018  2:30 PM     Your access code will  in 90 days. If you need help or a new code, please contact your [facility] or call [phone] for assistance.        Care EveryWhere ID     This is your Care EveryWhere ID. This could be used by other organizations to access your [facility] medical records  [id number]        Your Vitals Were     Pulse Temperature Respirations Height Pulse Oximetry Breastfeeding?    59 97.9  F (36.6  C) (Oral) 24 1.575 m (5' 2\") 98% No    BMI (Body Mass Index)                   32.94 kg/m2            Blood Pressure from Last 3 Encounters:   18 119/77   18 120/68   18 117/77    Weight from Last 3 Encounters:   18 81.7 kg (180 lb 1.6 oz)   18 80.3 kg (177 lb)   18 78.7 kg (173 lb 8 oz)               Primary Care Provider Office Phone # Fax #    Philip Matias [phone]       [place] [address]        Equal Access to Services     ANTHONY MATA AH: Hadii fransisca ku hadasho Soomaali, waaxda luqadaha, qaybta kaalmada adeegyada, waxay idiin haynarciso goode. So [facility] [phone].    ATENCIÓN: Si habla español, tiene a mancuso disposición servicios gratuitos de asistencia lingüística. gregorio al [phone].    We comply with applicable federal civil rights laws and [place] laws. We do not discriminate on the basis of race, color, national origin, age, disability, sex, sexual orientation, or gender identity.            Thank you!     Thank you for choosing [facility] NEUROLOGY  for your care. Our goal is always to provide you with excellent care. Hearing back from our patients is one way we can continue to improve our services. Please take a few minutes to complete the written survey that you may receive in the mail after your visit with us. Thank you!             Your Updated " Medication List - Protect others around you: Learn how to safely use, store and throw away your medicines at www.disposemymeds.org.          This list is accurate as of 6/14/18  4:24 PM.  Always use your most recent med list.                   Brand Name Dispense Instructions for use Diagnosis    aspirin 81 MG chewable tablet     30 tablet    Take 1 tablet (81 mg) by mouth daily    Middle cerebral artery stenosis, left       atorvastatin 20 MG tablet    LIPITOR    30 tablet    Take 1 tablet (20 mg) by mouth daily    Middle cerebral artery stenosis, left

## 2018-06-15 NOTE — PROGRESS NOTES
Service Date: 2018      Philip Matias MD   [address]   [place]  [address]       RE: Aliya Rios   MRN: [id number]   : 1990      Dear Dr. Matias:      Mrs. Aliya Rios is 28 years old and she just recently got .  I saw her on , and reference is made to my note.  She had study at that time following up her hospitalization for a TIA and a possible vasoconstrictive syndrome with occlusion of branch of the left MCA showed that the stenosis had continued to increase in the left middle cerebral artery branch.  She had been treated with vasodilation at the time it acutely happened.  This is all in my note.  She had seen Dr. Paniagua in the hospital.      I discussed the case with Dr. Donell Saavedra, and he will be seeing her in October.  In the meantime, she returns today and has had no further episodes of speech problem, no headaches, which she had at that time, or anything else.  She, however, has not been taking the aspirin as recommended or the lipid-lowering agent.      On exam today her blood pressure is 119/77, pulse 59.  Her speech is very fluent.  She shows no deficit.  We spoke and we asked her to take the aspirin regularly, also stay on the Lipitor, and we will repeat the CTA in 3 months, shortly before she sees Dr. Saavedra again.  Dr. Saavedra thought that perhaps it could be some sort of focal vasculitic process.  Her workup has included all the coagulation parameters, echo of the heart with bubble and everything has been normal.  We will see her in 3 months.      Sincerely,            MD ROSARIO Carter MD             D: 2018   T: 06/15/2018   MT: LIZETTE      Name:     ALIYA RIOS   MRN:      [id number]        Account:      [id number]   :      1990           Service Date: 2018      Document: [id number]

## 2018-07-25 ENCOUNTER — HEALTH MAINTENANCE LETTER (OUTPATIENT)
Age: 28
End: 2018-07-25

## 2018-11-03 ENCOUNTER — TELEPHONE (OUTPATIENT)
Dept: CARDIOLOGY | Facility: CLINIC | Age: 28
End: 2018-11-03

## 2020-03-11 ENCOUNTER — HEALTH MAINTENANCE LETTER (OUTPATIENT)
Age: 30
End: 2020-03-11

## 2021-01-03 ENCOUNTER — HEALTH MAINTENANCE LETTER (OUTPATIENT)
Age: 31
End: 2021-01-03

## 2021-04-25 ENCOUNTER — HEALTH MAINTENANCE LETTER (OUTPATIENT)
Age: 31
End: 2021-04-25

## 2021-10-10 ENCOUNTER — HEALTH MAINTENANCE LETTER (OUTPATIENT)
Age: 31
End: 2021-10-10

## 2022-04-11 ENCOUNTER — TRANSFERRED RECORDS (OUTPATIENT)
Dept: HEALTH INFORMATION MANAGEMENT | Facility: CLINIC | Age: 32
End: 2022-04-11
Payer: COMMERCIAL

## 2022-04-12 ENCOUNTER — TRANSFERRED RECORDS (OUTPATIENT)
Dept: HEALTH INFORMATION MANAGEMENT | Facility: CLINIC | Age: 32
End: 2022-04-12
Payer: COMMERCIAL

## 2022-04-12 ENCOUNTER — MEDICAL CORRESPONDENCE (OUTPATIENT)
Dept: HEALTH INFORMATION MANAGEMENT | Facility: CLINIC | Age: 32
End: 2022-04-12

## 2022-04-12 ENCOUNTER — MEDICAL CORRESPONDENCE (OUTPATIENT)
Dept: HEALTH INFORMATION MANAGEMENT | Facility: CLINIC | Age: 32
End: 2022-04-12
Payer: COMMERCIAL

## 2022-04-13 ENCOUNTER — TRANSCRIBE ORDERS (OUTPATIENT)
Dept: MATERNAL FETAL MEDICINE | Facility: HOSPITAL | Age: 32
End: 2022-04-13
Payer: COMMERCIAL

## 2022-04-13 DIAGNOSIS — O26.90 PREGNANCY RELATED CONDITION, ANTEPARTUM: Primary | ICD-10-CM

## 2022-04-14 ENCOUNTER — TRANSCRIBE ORDERS (OUTPATIENT)
Dept: MATERNAL FETAL MEDICINE | Facility: CLINIC | Age: 32
End: 2022-04-14
Payer: COMMERCIAL

## 2022-04-15 ENCOUNTER — TRANSCRIBE ORDERS (OUTPATIENT)
Dept: MATERNAL FETAL MEDICINE | Facility: CLINIC | Age: 32
End: 2022-04-15
Payer: COMMERCIAL

## 2022-04-15 DIAGNOSIS — Z31.69 ENCOUNTER FOR PRECONCEPTION CONSULTATION: Primary | ICD-10-CM

## 2022-05-21 ENCOUNTER — HEALTH MAINTENANCE LETTER (OUTPATIENT)
Age: 32
End: 2022-05-21

## 2022-08-15 ENCOUNTER — PRE VISIT (OUTPATIENT)
Dept: MATERNAL FETAL MEDICINE | Facility: CLINIC | Age: 32
End: 2022-08-15

## 2022-08-15 NOTE — PROGRESS NOTES
Maternal-Fetal Medicine Consultation    Aliya Rios  : 1990  MRN: [id number]    REFERRAL:  Aliya Rios is a 32 year old sent by Dr. Terrell from [facility] for preconception consultation.     HPI:  Aliya Rios is a 32 year old here for MFM preconception consultation for concerns regarding her history of stroke/TIA with subarachnoid hemorrhage in 2018. Patient also has history of hypertension, hyperlipidemia, and obesity.    Patient last saw neuro 2018. Patient has a history of headaches she attributes to dehydration, but 3/2018 was admitted with severe headache and word finding difficulties, found to have subarachnoid hemorrhage of L temporal area and high grade stenosis of L MCA. The stenosis was treated with verapamil injection w/o improvement in stenosis. She had further work-up which did not reveal other vascular occlusive disease. Echo w/ bubble were normal. Last intracranial Doppler showed persistent severe artery vasospasm. No hx of HTN or vascular disease at that time. She was started on aspirin and a statin. CTA 2018 showed progressive L proximal M1 segment stenosis with normal neck CTA & normal head CT. Coagulopathy panel was normal. Patient reports she briefly followed with neurology after this event, but after a few months stopped her ASA and statin. She was supposed to see Dr. Deal with Vascular Neurology, but did not follow up.     Since that time, patient reports she has been doing well with no neurologic complains. She was restarted on baby ASA, and lipid panel 2021 showed elevated cholesterol. Patient has had intermittently elevated BP but has never required medication.  It is not clear if she has chronic hypertension.  She has been struggling with infertility for two years, and work-up so far has been unrevealing. She and her partner would like to move forward with pregnancy as soon as possible.    Obstetrics History:  OB History    Para Term  AB  Living   0 0 0 0 0 0   SAB IAB Ectopic Multiple Live Births   0 0 0 0 0       Past Medical History:  Past Medical History:   Diagnosis Date     Migraines        Past Surgical History:  No past surgical history on file.    Current Medications:  Prior to Admission medications    Medication Sig Last Dose Taking? Auth Provider Long Term End Date   aspirin 81 MG chewable tablet Take 1 tablet (81 mg) by mouth daily   Cain Lewis MD         Allergies:  Patient has no known allergies.    Social History:   Social status:    Denies use of tobacco. Intermittent alcohol use    Family History:  Patient endorses history of aneurysm in aunt. No family history of stroke or blood clots.    ROS:  10-point ROS negative except as in HPI     PHYSICAL EXAM:  Vitals:    22 0841   BP: 125/83   BP Location: Left arm   Patient Position: Sitting   Cuff Size: Adult Large   Pulse: 76   SpO2: 100%     Further Physical Exam Deferred      ASSESSMENT/PLAN:  Aliya Rios is a 32 year old here for [facility] preconception consultation for concerns regarding history of TIA and subarachnoid hemorrhage.    #Hx TIA, subarachnoid hemorrhage  Unique factors contribute to the pathophysiology of maternal ischemic and hemorrhagic stroke and may help to inform acute management.  Pregnancy is widely thought of as a risk factor for stroke due to hypercoagulability and hemodyanmic changes, the risk at time of delivery and during puerperium is also greater than the antepartum risk. Higher relative risk of stroke is seen in peripartum women and the risk is increased for up to 6 weeks postpartum.  Unlike overall stroke where 87% of strokes are ischemic, up to 66% of maternal strokes are hemorrhagic. Hemorrhagic strokes related to PEC occur almost exclusively in the peripartum and postpartum period.      Risk factors for stroke including HTN disorders,  section, sickle cell disease, infection, migraine, prothrombotic state, preexisting valvular, CHD  or ischemic heart disease. Guidelines recommend continuing tailored secondary stroke prevention and treating risk factors such as hypertension aggressively during pregnancy in women with a history of stroke.    Patient has not seen Neurology in some time. While her initial work-up was normal, she was noted to have a high grade MCA stenosis. Per Neurology's notes, they were concerned about a possible focal vasculitic process. They recommended she see Dr. Saavedra with Vascular Neurology, but she never followed-up. We discussed that if she has an underlying vasculitis, this can be cause additional complications during pregnancy. Therefore, we would recommend she re-establish care with neurology for further work-up prior to considering pregnancy.  Once her evaluation is completed and the etiology of her prior CVA is better elucidated, we can further discuss optimizing Aliya's health prior to pregnancy.     Recommendations:  - Continue 81 mg ASA  - Re-establish care with Dr. Saavedra or colleague with Vascular Neurology  - Further management in a possible pregnancy pending further work-up.  Recommend that the patient call us once she has completed her evaluation with Neurology for any further recommendations regarding pregnancy.     #Hyperlipidemia  Patient has hyperlipidemia confirmed with recent lipid panel. Patient was briefly managed with Lipitor after the stroke, but stopped taking it after a few months (self-discontinued). We discussed the risks associated with hyperlipidemia, particularly in the setting of hr prior stroke. Hyperlipidemia can be due to diet and lifestyle, but can also be due to genetic causes. We recommend establishing care with a PCP for further management of hyperlipidemia and work-up for other possible genetic causes. During pregnancy, we would expect cholesterol levels to rise. However, we recommend against the use of statins during pregnancy.     Recommendations:  - Referral to primary care  physician for further management (we have referred the patient to a PCP)  - Statin use not recommended for hypercholesterolemia during pregnancy    # Possible Chronic Hypertension  It is not clear if Aliya Rios has a diagnosis of CHTN; she is normotensive today.  Recommend continued care with PCP for routine BP monitoring.  We reviewed that if she was diagnosed with CHTN, such patients are more likely to develop preeclampsia during the pregnancy.  Women with chronic hypertension are at increased risk for early-onset preeclampsia.  Low dose aspirin has been used to lower this risk.  Chronic hypertension also increases the risk of maternal stroke, pulmonary edema, renal failure, gestational diabetes, iatrogenic  birth, fetal growth restriction, placental abruption and  mortality rate including stillbirth. Patient has normal blood pressure today, and has not required medications to manage her blood pressure.    Recommendations:  - Continue BP monitoring with PCP    # Preconception Counseling  We discussed risks associated with pregnancy given patient's co-morbidities as discussed above. We discussed the goal of optimizing health prior to pregnancy as much as possible.    Recommendations:  - Optimization of the patient's comorbid conditions  - Avoidance of alcohol  - Daily prenatal vitamin  - Daily folic acid supplement (at least 400 mcg daily) 3 months prior to conception.     Patient seen and staffed with Dr. Gonzalez.    Joaquina Duarte MD  OB/GYN Resident, PGY-2    Physician Attestation   I, Regla Gonzalez MD, saw this patient and agree with the findings and plan of care as documented in the note.      Items personally reviewed/procedural attestation: vitals, labs and imaging and agree with the interpretation documented in the note.    Regla Gonzalez MD     I spent a total of 45 minutes on the date of this encounter in the care of Aliya Rios, including: 10 minutes reviewing the patient's chart, 25  minutes in direct patient contact, 10 minutes documenting in the medical record.   Please see note for details.

## 2022-08-18 ENCOUNTER — OFFICE VISIT (OUTPATIENT)
Dept: MATERNAL FETAL MEDICINE | Facility: CLINIC | Age: 32
End: 2022-08-18
Attending: OBSTETRICS & GYNECOLOGY
Payer: COMMERCIAL

## 2022-08-18 VITALS — DIASTOLIC BLOOD PRESSURE: 83 MMHG | HEART RATE: 76 BPM | OXYGEN SATURATION: 100 % | SYSTOLIC BLOOD PRESSURE: 125 MMHG

## 2022-08-18 DIAGNOSIS — Z31.69 ENCOUNTER FOR PRECONCEPTION CONSULTATION: Primary | ICD-10-CM

## 2022-08-18 DIAGNOSIS — I60.9 SUBARACHNOID HEMORRHAGE (H): ICD-10-CM

## 2022-08-18 PROCEDURE — 99204 OFFICE O/P NEW MOD 45 MIN: CPT | Mod: GC | Performed by: OBSTETRICS & GYNECOLOGY

## 2022-08-18 NOTE — PROGRESS NOTES
Pt presents to [facility] for preconception consult due to hx of TIA/Stroke. Pt was seen today by Dr. Gonzalez and Dr. Duarte. See note in epic for today's discussion and recommendations. Questions answered. Pt given referral to Neurology to see Vascular Neurology and also to set up an apt with Internal Medicine with S. Pt discharged stable at this time. Grace Andres RN

## 2022-09-18 ENCOUNTER — HEALTH MAINTENANCE LETTER (OUTPATIENT)
Age: 32
End: 2022-09-18

## 2022-10-14 ENCOUNTER — OFFICE VISIT (OUTPATIENT)
Dept: INTERNAL MEDICINE | Facility: CLINIC | Age: 32
End: 2022-10-14
Attending: INTERNAL MEDICINE
Payer: COMMERCIAL

## 2022-10-14 VITALS
DIASTOLIC BLOOD PRESSURE: 81 MMHG | HEIGHT: 62 IN | HEART RATE: 73 BPM | BODY MASS INDEX: 35.51 KG/M2 | SYSTOLIC BLOOD PRESSURE: 126 MMHG | WEIGHT: 193 LBS

## 2022-10-14 DIAGNOSIS — Z29.9 PREVENTIVE MEASURE: ICD-10-CM

## 2022-10-14 DIAGNOSIS — I60.9 SUBARACHNOID HEMORRHAGE (H): Primary | ICD-10-CM

## 2022-10-14 DIAGNOSIS — Z23 NEED FOR INFLUENZA VACCINATION: ICD-10-CM

## 2022-10-14 LAB
ALBUMIN SERPL-MCNC: 4.1 G/DL (ref 3.4–5)
ALP SERPL-CCNC: 56 U/L (ref 40–150)
ALT SERPL W P-5'-P-CCNC: 39 U/L (ref 0–50)
ANION GAP SERPL CALCULATED.3IONS-SCNC: 6 MMOL/L (ref 3–14)
AST SERPL W P-5'-P-CCNC: 22 U/L (ref 0–45)
BILIRUB SERPL-MCNC: 0.4 MG/DL (ref 0.2–1.3)
BUN SERPL-MCNC: 12 MG/DL (ref 7–30)
CALCIUM SERPL-MCNC: 9.5 MG/DL (ref 8.5–10.1)
CHLORIDE BLD-SCNC: 106 MMOL/L (ref 94–109)
CHOLEST SERPL-MCNC: 274 MG/DL
CO2 SERPL-SCNC: 28 MMOL/L (ref 20–32)
CREAT SERPL-MCNC: 0.62 MG/DL (ref 0.52–1.04)
ERYTHROCYTE [DISTWIDTH] IN BLOOD BY AUTOMATED COUNT: 13.2 % (ref 10–15)
FASTING STATUS PATIENT QL REPORTED: YES
GFR SERPL CREATININE-BSD FRML MDRD: >90 ML/MIN/1.73M2
GLUCOSE BLD-MCNC: 88 MG/DL (ref 70–99)
HBA1C MFR BLD: 5.3 % (ref 0–5.6)
HCT VFR BLD AUTO: 39.6 % (ref 35–47)
HCV AB SERPL QL IA: NONREACTIVE
HDLC SERPL-MCNC: 56 MG/DL
HGB BLD-MCNC: 13.4 G/DL (ref 11.7–15.7)
HIV 1+2 AB+HIV1 P24 AG SERPL QL IA: NONREACTIVE
LDLC SERPL CALC-MCNC: 191 MG/DL
MCH RBC QN AUTO: 29.3 PG (ref 26.5–33)
MCHC RBC AUTO-ENTMCNC: 33.8 G/DL (ref 31.5–36.5)
MCV RBC AUTO: 87 FL (ref 78–100)
NONHDLC SERPL-MCNC: 218 MG/DL
PLATELET # BLD AUTO: 297 10E3/UL (ref 150–450)
POTASSIUM BLD-SCNC: 4.1 MMOL/L (ref 3.4–5.3)
PROT SERPL-MCNC: 8.1 G/DL (ref 6.8–8.8)
RBC # BLD AUTO: 4.57 10E6/UL (ref 3.8–5.2)
SODIUM SERPL-SCNC: 140 MMOL/L (ref 133–144)
TRIGL SERPL-MCNC: 136 MG/DL
WBC # BLD AUTO: 8.6 10E3/UL (ref 4–11)

## 2022-10-14 PROCEDURE — 80061 LIPID PANEL: CPT | Performed by: INTERNAL MEDICINE

## 2022-10-14 PROCEDURE — 85027 COMPLETE CBC AUTOMATED: CPT | Performed by: INTERNAL MEDICINE

## 2022-10-14 PROCEDURE — G0463 HOSPITAL OUTPT CLINIC VISIT: HCPCS

## 2022-10-14 PROCEDURE — 83036 HEMOGLOBIN GLYCOSYLATED A1C: CPT | Performed by: INTERNAL MEDICINE

## 2022-10-14 PROCEDURE — 99204 OFFICE O/P NEW MOD 45 MIN: CPT | Performed by: INTERNAL MEDICINE

## 2022-10-14 PROCEDURE — 80053 COMPREHEN METABOLIC PANEL: CPT | Performed by: INTERNAL MEDICINE

## 2022-10-14 PROCEDURE — 250N000011 HC RX IP 250 OP 636

## 2022-10-14 PROCEDURE — 36415 COLL VENOUS BLD VENIPUNCTURE: CPT | Performed by: INTERNAL MEDICINE

## 2022-10-14 PROCEDURE — G0008 ADMIN INFLUENZA VIRUS VAC: HCPCS

## 2022-10-14 PROCEDURE — 86803 HEPATITIS C AB TEST: CPT | Performed by: INTERNAL MEDICINE

## 2022-10-14 PROCEDURE — 87389 HIV-1 AG W/HIV-1&-2 AB AG IA: CPT | Performed by: INTERNAL MEDICINE

## 2022-10-14 PROCEDURE — 90686 IIV4 VACC NO PRSV 0.5 ML IM: CPT

## 2022-10-14 ASSESSMENT — ENCOUNTER SYMPTOMS
RECTAL PAIN: 0
CONSTIPATION: 0
HEARTBURN: 0
NAUSEA: 0
JAUNDICE: 0
BOWEL INCONTINENCE: 0
ABDOMINAL PAIN: 0
DIARRHEA: 0
BLOATING: 0
BLOOD IN STOOL: 1
VOMITING: 0

## 2022-10-14 ASSESSMENT — ANXIETY QUESTIONNAIRES
7. FEELING AFRAID AS IF SOMETHING AWFUL MIGHT HAPPEN: NOT AT ALL
IF YOU CHECKED OFF ANY PROBLEMS ON THIS QUESTIONNAIRE, HOW DIFFICULT HAVE THESE PROBLEMS MADE IT FOR YOU TO DO YOUR WORK, TAKE CARE OF THINGS AT HOME, OR GET ALONG WITH OTHER PEOPLE: NOT DIFFICULT AT ALL
7. FEELING AFRAID AS IF SOMETHING AWFUL MIGHT HAPPEN: NOT AT ALL
5. BEING SO RESTLESS THAT IT IS HARD TO SIT STILL: NOT AT ALL
5. BEING SO RESTLESS THAT IT IS HARD TO SIT STILL: NOT AT ALL
2. NOT BEING ABLE TO STOP OR CONTROL WORRYING: NOT AT ALL
6. BECOMING EASILY ANNOYED OR IRRITABLE: SEVERAL DAYS
7. FEELING AFRAID AS IF SOMETHING AWFUL MIGHT HAPPEN: NOT AT ALL
4. TROUBLE RELAXING: NOT AT ALL
8. IF YOU CHECKED OFF ANY PROBLEMS, HOW DIFFICULT HAVE THESE MADE IT FOR YOU TO DO YOUR WORK, TAKE CARE OF THINGS AT HOME, OR GET ALONG WITH OTHER PEOPLE?: NOT DIFFICULT AT ALL
GAD7 TOTAL SCORE: 3
2. NOT BEING ABLE TO STOP OR CONTROL WORRYING: NOT AT ALL
GAD7 TOTAL SCORE: 3
6. BECOMING EASILY ANNOYED OR IRRITABLE: SEVERAL DAYS
GAD7 TOTAL SCORE: 3
3. WORRYING TOO MUCH ABOUT DIFFERENT THINGS: SEVERAL DAYS
1. FEELING NERVOUS, ANXIOUS, OR ON EDGE: SEVERAL DAYS
1. FEELING NERVOUS, ANXIOUS, OR ON EDGE: SEVERAL DAYS
3. WORRYING TOO MUCH ABOUT DIFFERENT THINGS: SEVERAL DAYS

## 2022-10-14 ASSESSMENT — PAIN SCALES - GENERAL: PAINLEVEL: NO PAIN (0)

## 2022-10-14 ASSESSMENT — PATIENT HEALTH QUESTIONNAIRE - PHQ9
SUM OF ALL RESPONSES TO PHQ QUESTIONS 1-9: 3
5. POOR APPETITE OR OVEREATING: NOT AT ALL

## 2022-10-14 NOTE — LETTER
10/14/2022       RE: Aliya Rios  [address] [facility] [address]     Dear Colleague,    Thank you for referring your patient, Aliya Rios, to the [facility] [place] at [facility]. Please see a copy of my visit note below.      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  Clinical possibilities include spontaneous middle cerebral artery dissection, thrombosed cerebral artery aneurysm, as well as local vasculitic process.  Recommend repeating intracranial vasculature imaging with MRA, however given follow-up CT angiogram findings completed several months after her initial subarachnoid hemorrhage with short segment occlusion and reconstitution of the flow through collaterals, differential diagnosis remains same.  Given the patient had normal imaging of other arteries, atherosclerotic or vasculitic process appears to be somewhat less likely.  Thrombosed intracranial aneurysm or dissection could have both contributed to patient presentation with subarachnoid hemorrhage.  Therefore, patient was advised to follow-up with neurology as currently planned.  It is not clear to what degree aggressive risk factor modifications, such as lipid management, will reduce her risk of recurrent events during pregnancy.  Aspirin therapy as well as lipid-lowering therapy can be both continued prior to pregnancy, and patient will be advised on lipid panel results accordingly.  Also recommend aggressive lifestyle modifications, with regular exercise and dietary changes aimed at lipid reduction, as well as weight loss.  Patient's blood pressure is mildly elevated today.  However, antihypertensive therapy is not indicated at this time.  Patient is asymptomatic and has normal neurological exam.  - Lipid Profile; Future  - Hemoglobin A1c; Future  -  "Comprehensive metabolic panel; Future  - CBC with platelets; Future  - Lipid Profile  - Hemoglobin A1c  - Comprehensive metabolic panel  - CBC with platelets    Need for influenza vaccination  Discussed influenza vaccine, patient has agreed to proceed with vaccination.  - INFLUENZA VACCINE IM >6 MO VALENT IIV4 (AFLURIA/FLUZONE)    Preventive measure  Reviewed recommendations on preventive healthcare needs with patient.  We will screen for hepatitis C C as well as HIV.  Patient was encouraged to schedule annual exam for Pap smear.  - HIV Antigen Antibody Combo; Future  - Hepatitis C Screen Reflex to HCV RNA Quant and Genotype; Future  - HIV Antigen Antibody Combo  - Hepatitis C Screen Reflex to HCV RNA Quant and Genotype      I spent a total of 52 minutes on the day of the visit.   Time spent doing chart review, history and exam, documentation and further activities per the note       BMI:   Estimated body mass index is 35.3 kg/m  as calculated from the following:    Height as of this encounter: 1.575 m (5' 2\").    Weight as of this encounter: 87.5 kg (193 lb).     No follow-ups on file.    Lianet Mercer MD  [facility]   Aliya is a 32 year old, presenting for the following health issues:  Establish Care      HPI     Patient was referred by maternal-fetal medicine provider for risk factor assessment and modifications.  She reports that in 2018 she had subarachnoid hemorrhage and was further evaluated.  She was advised to start statin therapy and aspirin therapy.  She has discontinued both medications since that time.  She reports that she is currently planning a pregnancy and has been advised to see both neurology and internal medicine provider for evaluation.    Patient states that she has not had any headaches, vision changes, numbness or tingling in upper or lower extremities, or weakness in arms or legs.  She has been doing well and has been in her usual state " "of health.  She states that she does not exercise on a regular basis.    Review of Systems   Gastrointestinal: Negative for abdominal pain, constipation, diarrhea, heartburn, nausea, rectal pain and vomiting.      Constitutional, HEENT, cardiovascular, pulmonary, GI, , musculoskeletal, neuro, skin, endocrine and psych systems are negative, except as otherwise noted.     Objective    /81   Pulse 73   Ht 1.575 m (5' 2\")   Wt 87.5 kg (193 lb)   LMP 09/20/2022   BMI 35.30 kg/m    Body mass index is 35.3 kg/m .  Physical Exam   GENERAL: healthy, alert and no distress  EYES: Eyes grossly normal to inspection, PERRL and conjunctivae and sclerae normal  RESP: lungs clear to auscultation - no rales, rhonchi or wheezes  CV: regular rate and rhythm, normal S1 S2, no S3 or S4, no murmur, click or rub, no peripheral edema and peripheral pulses strong  ABDOMEN: soft, nontender, no hepatosplenomegaly, no masses and bowel sounds normal  MS: no gross musculoskeletal defects noted, no edema      "

## 2022-10-14 NOTE — PROGRESS NOTES
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  Clinical possibilities include spontaneous middle cerebral artery dissection, thrombosed cerebral artery aneurysm, as well as local vasculitic process.  Recommend repeating intracranial vasculature imaging with MRA, however given follow-up CT angiogram findings completed several months after her initial subarachnoid hemorrhage with short segment occlusion and reconstitution of the flow through collaterals, differential diagnosis remains same.  Given the patient had normal imaging of other arteries, atherosclerotic or vasculitic process appears to be somewhat less likely.  Thrombosed intracranial aneurysm or dissection could have both contributed to patient presentation with subarachnoid hemorrhage.  Therefore, patient was advised to follow-up with neurology as currently planned.  It is not clear to what degree aggressive risk factor modifications, such as lipid management, will reduce her risk of recurrent events during pregnancy.  Aspirin therapy as well as lipid-lowering therapy can be both continued prior to pregnancy, and patient will be advised on lipid panel results accordingly.  Also recommend aggressive lifestyle modifications, with regular exercise and dietary changes aimed at lipid reduction, as well as weight loss.  Patient's blood pressure is mildly elevated today.  However, antihypertensive therapy is not indicated at this time.  Patient is asymptomatic and has normal neurological exam.  - Lipid Profile; Future  - Hemoglobin A1c; Future  - Comprehensive metabolic panel; Future  - CBC with platelets; Future  - Lipid Profile  - Hemoglobin A1c  - Comprehensive metabolic panel  - CBC with platelets    Need for influenza vaccination  Discussed influenza vaccine, patient has agreed to proceed with vaccination.  - INFLUENZA VACCINE IM >6 MO VALENT  "IIV4 (AFLURIA/FLUZONE)    Preventive measure  Reviewed recommendations on preventive healthcare needs with patient.  We will screen for hepatitis C C as well as HIV.  Patient was encouraged to schedule annual exam for Pap smear.  - HIV Antigen Antibody Combo; Future  - Hepatitis C Screen Reflex to HCV RNA Quant and Genotype; Future  - HIV Antigen Antibody Combo  - Hepatitis C Screen Reflex to HCV RNA Quant and Genotype      I spent a total of 52 minutes on the day of the visit.   Time spent doing chart review, history and exam, documentation and further activities per the note       BMI:   Estimated body mass index is 35.3 kg/m  as calculated from the following:    Height as of this encounter: 1.575 m (5' 2\").    Weight as of this encounter: 87.5 kg (193 lb).           No follow-ups on file.    Lianet Mercer MD  [facility] [place]    Mike Pisano is a 32 year old, presenting for the following health issues:  Establish Care      HPI     Patient was referred by maternal-fetal medicine provider for risk factor assessment and modifications.  She reports that in 2018 she had subarachnoid hemorrhage and was further evaluated.  She was advised to start statin therapy and aspirin therapy.  She has discontinued both medications since that time.  She reports that she is currently planning a pregnancy and has been advised to see both neurology and internal medicine provider for evaluation.    Patient states that she has not had any headaches, vision changes, numbness or tingling in upper or lower extremities, or weakness in arms or legs.  She has been doing well and has been in her usual state of health.  She states that she does not exercise on a regular basis.    Review of Systems   Gastrointestinal: Negative for abdominal pain, constipation, diarrhea, heartburn, nausea, rectal pain and vomiting.      Constitutional, HEENT, cardiovascular, pulmonary, GI, , musculoskeletal, neuro, skin, " "endocrine and psych systems are negative, except as otherwise noted.      Objective    /81   Pulse 73   Ht 1.575 m (5' 2\")   Wt 87.5 kg (193 lb)   LMP 09/20/2022   BMI 35.30 kg/m    Body mass index is 35.3 kg/m .  Physical Exam   GENERAL: healthy, alert and no distress  EYES: Eyes grossly normal to inspection, PERRL and conjunctivae and sclerae normal  RESP: lungs clear to auscultation - no rales, rhonchi or wheezes  CV: regular rate and rhythm, normal S1 S2, no S3 or S4, no murmur, click or rub, no peripheral edema and peripheral pulses strong  ABDOMEN: soft, nontender, no hepatosplenomegaly, no masses and bowel sounds normal  MS: no gross musculoskeletal defects noted, no edema                      "

## 2022-10-15 ENCOUNTER — HOSPITAL ENCOUNTER (OUTPATIENT)
Dept: MRI IMAGING | Facility: CLINIC | Age: 32
Discharge: HOME | End: 2022-10-15
Attending: INTERNAL MEDICINE | Admitting: INTERNAL MEDICINE
Payer: COMMERCIAL

## 2022-10-15 DIAGNOSIS — I60.9 SUBARACHNOID HEMORRHAGE (H): ICD-10-CM

## 2022-10-15 PROCEDURE — 70544 MR ANGIOGRAPHY HEAD W/O DYE: CPT | Mod: 26 | Performed by: RADIOLOGY

## 2022-10-15 PROCEDURE — 70544 MR ANGIOGRAPHY HEAD W/O DYE: CPT

## 2022-10-16 ASSESSMENT — ENCOUNTER SYMPTOMS
CONSTIPATION: 0
DIARRHEA: 0
NAUSEA: 0
RECTAL PAIN: 0
VOMITING: 0
ABDOMINAL PAIN: 0
HEARTBURN: 0

## 2022-10-18 ENCOUNTER — TELEPHONE (OUTPATIENT)
Dept: NEUROSURGERY | Facility: CLINIC | Age: 32
End: 2022-10-18

## 2022-10-18 ENCOUNTER — TELEPHONE (OUTPATIENT)
Dept: INTERNAL MEDICINE | Facility: CLINIC | Age: 32
End: 2022-10-18

## 2022-10-18 DIAGNOSIS — I67.1 CEREBRAL ANEURYSM, NONRUPTURED: ICD-10-CM

## 2022-10-18 DIAGNOSIS — E78.00 PURE HYPERCHOLESTEROLEMIA: Primary | ICD-10-CM

## 2022-10-18 RX ORDER — ATORVASTATIN CALCIUM 20 MG/1
20 TABLET, FILM COATED ORAL DAILY
Qty: 30 TABLET | Refills: 3 | Status: SHIPPED | OUTPATIENT
Start: 2022-10-18

## 2022-10-18 NOTE — TELEPHONE ENCOUNTER
Discussed test results with patient. Recommend starting Lipitor, advised to continue until pregnancy. Also advised to see Neurosurgery for evaluation of cerebral aneurysm. Patient is in agreement with the plan.   Lianet Mercer MD

## 2022-10-19 NOTE — TELEPHONE ENCOUNTER
FUTURE VISIT INFORMATION      FUTURE VISIT INFORMATION:    Date: 10/31/2022    Time: 320pm    [place] [facility] [place]  REFERRAL INFORMATION:    Referring provider:  Dr. Mercer     Referring providers clinic:  [facility]     Reason for visit/diagnosis  Subarachnoid Hemorrhage     RECORDS REQUESTED FROM:       Clinic name Comments Records Status Imaging Status   Internal Dr. Mercer-10/14/2022    MRA Brain-10/15/2022    CTA Head/Neck-3/3/2018    MR Brain-3/3/2018 Epic PACS

## 2022-10-24 ENCOUNTER — TELEPHONE (OUTPATIENT)
Dept: NEUROLOGY | Facility: CLINIC | Age: 32
End: 2022-10-24

## 2022-10-24 NOTE — TELEPHONE ENCOUNTER
----- Message from Jerry Gonzalez RN sent at 10/19/2022  2:54 PM CDT -----  Regarding: RE: Prior subarachnoid hemorrhage and M1 occlusion  Patient has appointment with dr. Torres and we will work on getting her in to see a stroke neurologist    ----- Message -----  From: Raj Apodaca MD  Sent: 10/18/2022   8:55 PM CDT  To: Alejandra Perez, RN, Tova Schwab RN  Subject: FW: Prior subarachnoid hemorrhage and M1 occ#    Hello,     This patient is scheduled with me in January. I think she would be better served seeing a stroke neurologist instead. Can we reschedule patient with stroke neurology? Thanks!    Raj   ----- Message -----  From: Lianet Mercer MD  Sent: 10/16/2022   8:07 PM CDT  To: Raj Apodaca MD  Subject: Prior subarachnoid hemorrhage and M1 occlusi#    Hi, Raj,    I saw this lady in consultation from [facility]. She had SAH in 2018, had ipsilateral short segment M1 stenosis and subsequent occlusion. My best guess would be a spontaneous dissection, but I could be way off on the cause. She is considering pregnancy, and [facility] would like to address her risk of stroke.   I will be starting her on statin as her LDL is really high. However, it is somewhat unlikely that her SAH and M1 occlusion were atherosclerotic in nature. I will repeat MRA for her. Any thoughts on evaluation before she comes to see you?    Thank you!  Lianet

## 2022-10-31 ENCOUNTER — VIRTUAL VISIT (OUTPATIENT)
Dept: NEUROSURGERY | Facility: CLINIC | Age: 32
End: 2022-10-31
Payer: COMMERCIAL

## 2022-10-31 ENCOUNTER — PRE VISIT (OUTPATIENT)
Dept: NEUROSURGERY | Facility: CLINIC | Age: 32
End: 2022-10-31

## 2022-10-31 DIAGNOSIS — I67.1 CEREBRAL ANEURYSM, NONRUPTURED: ICD-10-CM

## 2022-10-31 PROCEDURE — 99203 OFFICE O/P NEW LOW 30 MIN: CPT | Mod: 95 | Performed by: NEUROLOGICAL SURGERY

## 2022-10-31 NOTE — PROGRESS NOTES
Aliya is a 32 year old who is being evaluated via a billable video visit.      Visit duration: 20 min    Please see dictation for visit details.

## 2022-10-31 NOTE — LETTER
10/31/2022       RE: Aliya Rios  [address] [facility] [address]     Dear Colleague,    Thank you for referring your patient, Aliya Rios, to the [facility]. Please see a copy of my visit note below.    Aliya is a 32 year old who is being evaluated via a billable video visit.      Visit duration: 20 min    Please see dictation for visit details.    Service Date: 10/31/2022    Philip Matias MD  [id number] [facility], [address]  [place], [place], [address]     RE:  Aliya Rios  MRN:  [id number]  :  1990    Dear Dr. Matias:      I had the pleasure to see Aliya Rios and her , Domonique Falcon, today during the neurosurgical video clinic visit.  They gave consent for this visit.    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    Aliya recently had an MR angiogram.  It is unclear to me what this was ordered for, but there was a possible aneurysm detected at the proximal right posterior cerebral artery that measures 1 mm in size.    In further review of her history, it is interesting that she had presented here to the [facility] in 2018 with a small subarachnoid hemorrhage.  At that time, she had an MRI that demonstrated subarachnoid hemorrhage over the left convexity.  In reviewing the imaging study, it does not look like there was any blood around the [place] of [place].  At that time, she was evaluated by our service and my partner, Dr. Ro Tsang, performed a cerebral angiogram on 2018.  I have also reviewed this and on that study, there is no evidence of aneurysm.  There was a fairly significant stenosis of the left proximal M1 segment.  This was isolated and not consistent with moyamoya, but an isolated focal stenosis.  There was good collateral circulation;  however, to the middle cerebral artery territory.  She then had a CT angiogram in 2018.  Again, this did not demonstrate any brain aneurysm.    I have reviewed her MRA today that demonstrates a possible tiny aneurysm at the proximal PCA segment.  I do not think that this was responsible for the cortical subarachnoid hemorrhage that was seen in 2018.  I also do not see any evidence of this on the previous imaging studies.  As such, this could be a new tiny aneurysm on the right PCA.  This is not something that requires treatment at this point in time, but should be monitored over time.  I would like to repeat an MR angiogram of the brain in 1 year, and then have her see me at that point in time for followup.      Lastly, she mentioned that she and her  are trying to get pregnant.  The obstetrician had some concerns with regards to the aneurysm.  From my professional perspective, this should not preclude her from getting pregnant and really does not need to be a concern during pregnancy.    Sincerely,    Gurpreet Torres MD        D: 10/31/2022   T: 10/31/2022   MT: tali    Name:     ROGELIO MORSE  MRN:      [id number]        Account:      [id number]   :      1990           Service Date: 10/31/2022       Document: [id number]

## 2022-11-01 NOTE — PATIENT INSTRUCTIONS
"Follow up in 1 year with Dr Torres with a MR Angiogram of the Brain without contrast prior.  Order Entered.     Per Dr Torres:  \" The obstetrician had some concerns with regards to the aneurysm.  From my professional perspective, this should not preclude her from getting pregnant and really does not need to be a concern during pregnancy.\"    Call Katina BURKS  Neurosurgery Care Coordinator with questions/concerns     Thank you for using [facility]             "

## 2022-11-01 NOTE — PROGRESS NOTES
Service Date: 10/31/2022    Philip Matias MD  [id number] [facility], [address]  [place], [address]     RE:  Aliya Rios  MRN:  [id number]  :  1990    Dear Dr. Matias:      I had the pleasure to see Aliya Rios and her , Domonique Falcon, today during the neurosurgical video clinic visit.  They gave consent for this visit.    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    Aliya recently had an MR angiogram.  It is unclear to me what this was ordered for, but there was a possible aneurysm detected at the proximal right posterior cerebral artery that measures 1 mm in size.    In further review of her history, it is interesting that she had presented here to the [facility] in 2018 with a small subarachnoid hemorrhage.  At that time, she had an MRI that demonstrated subarachnoid hemorrhage over the left convexity.  In reviewing the imaging study, it does not look like there was any blood around the [place] of [place].  At that time, she was evaluated by our service and my partner, Dr. Ro Tsang, performed a cerebral angiogram on 2018.  I have also reviewed this and on that study, there is no evidence of aneurysm.  There was a fairly significant stenosis of the left proximal M1 segment.  This was isolated and not consistent with moyamoya, but an isolated focal stenosis.  There was good collateral circulation; however, to the middle cerebral artery territory.  She then had a CT angiogram in 2018.  Again, this did not demonstrate any brain aneurysm.    I have reviewed her MRA today that demonstrates a possible tiny aneurysm at the proximal PCA segment.  I do not think that this was responsible for the cortical subarachnoid hemorrhage that was seen in 2018.  I also do not see any evidence of this on the previous imaging studies.  As such, this could be a  new tiny aneurysm on the right PCA.  This is not something that requires treatment at this point in time, but should be monitored over time.  I would like to repeat an MR angiogram of the brain in 1 year, and then have her see me at that point in time for followup.      Lastly, she mentioned that she and her  are trying to get pregnant.  The obstetrician had some concerns with regards to the aneurysm.  From my professional perspective, this should not preclude her from getting pregnant and really does not need to be a concern during pregnancy.    Sincerely,    Gurpreet Torres MD        D: 10/31/2022   T: 10/31/2022   MT: tali    Name:     ROGELIO MORSE  MRN:      [id number]        Account:      [id number]   :      1990           Service Date: 10/31/2022       Document: [id number]

## 2022-12-23 ENCOUNTER — TRANSFERRED RECORDS (OUTPATIENT)
Dept: HEALTH INFORMATION MANAGEMENT | Facility: CLINIC | Age: 32
End: 2022-12-23
Payer: COMMERCIAL

## 2022-12-31 ENCOUNTER — TRANSFERRED RECORDS (OUTPATIENT)
Dept: HEALTH INFORMATION MANAGEMENT | Facility: CLINIC | Age: 32
End: 2022-12-31
Payer: COMMERCIAL

## 2023-01-19 ENCOUNTER — TRANSCRIBE ORDERS (OUTPATIENT)
Dept: MATERNAL FETAL MEDICINE | Facility: HOSPITAL | Age: 33
End: 2023-01-19
Payer: COMMERCIAL

## 2023-01-19 DIAGNOSIS — Z31.69 PRE-CONCEPTION COUNSELING: Primary | ICD-10-CM

## 2023-01-23 ENCOUNTER — TRANSCRIBE ORDERS (OUTPATIENT)
Dept: MATERNAL FETAL MEDICINE | Facility: HOSPITAL | Age: 33
End: 2023-01-23
Payer: COMMERCIAL

## 2023-01-23 DIAGNOSIS — Z31.69 ENCOUNTER FOR PRECONCEPTION CONSULTATION: Primary | ICD-10-CM

## 2023-01-24 DIAGNOSIS — Z31.69 ENCOUNTER FOR PRECONCEPTION CONSULTATION: Primary | ICD-10-CM

## 2023-01-27 DIAGNOSIS — Z31.69 ENCOUNTER FOR PRECONCEPTION CONSULTATION: Primary | ICD-10-CM

## 2023-05-22 NOTE — PROGRESS NOTES
Maternal-Fetal Medicine Consultation    Aliya Rios  : 1990  MRN: [id number]    REFERRAL:  Aliya Rios is a 33 year old sent by Dr. Terrell from [facility] for preconception consultation.    HPI:  Aliya Rios is a 33 year old  here for [facility] preconception consultation for history of spontaneous subarachnoid hemorrhage in 2018.  Also with history of hyperlipidemia and BMI 32.    She is here with her .    Patient was seen by [facility] for preconception consultation 2022, please see prior documentation for full details of counseling and history.    In brief, patient admitted 2018 with severe headache, word finding difficulty, and diagnosed with a small spontaneous subarachnoid hemorrhage of the L temporal area.  Per Dr. Torres review of MRI (reviewed several years later during consult), there was not any blood around the [place] of [place].  Dr. Ro Tsang performed a diagnostic cerebral angiogram 3/4/18.  On that study there was no evidence of aneurysm however there was fairly significant stenosis of the L proximal M1 segment.  Per Dr. Torres, this was isolated and not consistent with Moyamoya but an isolated focal stenosis.  The stenosis was treated with verapamil injection w/o improvement.  She had further work-up which did not reveal other vascular occlusive disease. Echo w/ bubble study was normal. She was started on aspirin and a statin. CTA 2018 showed progressive L proximal M1 segment stenosis with normal neck CTA & normal head CT; no evidence of aneurysm. Coagulopathy panel was normal. Patient reports she briefly followed with neurology after this event, but after a few months stopped her ASA and statin.    Since our last consultation she has seen Dr. Torres of neurosurgery for a consult 10/31/22 after MRA revealed a possible aneurysm detected at the proximal R posterior cerebral artery measuring 1 mm in size.  He does not feel that the possible tiny aneurysm seen  is responsible for the cortical SAH seen in 2018.  This does not require treatment at this time but should be monitored with repeat MRA in 1 year with follow up.  She has also seen internal medicine, Dr. Mercer, 10/14/22, and recommended to continue Lipitor and ASA as well as lifestyle modification.  Her BP was mildly elevated at that time however per Dr. Mercer antihypertensive therapy is not indicated.  She has not been diagnosed formally with chronic hypertension.  She has never required BP medication.    Since that time, patient reports she has been doing well with no neurologic complains.  She and her partner have been having difficulty getting pregnant and would like to move forward with infertility plan as soon as she is told it is safe to do so.     Obstetrics History:  OB History    Para Term  AB Living   0 0 0 0 0 0   SAB IAB Ectopic Multiple Live Births   0 0 0 0 0       Gynecologic History:  - Menstrual history:  No LMP recorded. (Menstrual status: Irregular Periods).   - Last Pap:  2021 NILM  - Denies any history of abnormal pap smears  - Denies prior cervical surgery or procedures  - Denies any history of frequent UTIs, vaginal infections, or STIs    Past Medical History:  Past Medical History:   Diagnosis Date     Infertility, female 2021     Middle cerebral artery stenosis      Migraines      Obesity      Subarachnoid hemorrhage (H)        Past Surgical History:  No past surgical history on file.    Current Medications:  Current Outpatient Medications   Medication Instructions     aspirin (ASA) 81 mg, Oral, DAILY     atorvastatin (LIPITOR) 20 mg, Oral, DAILY       Allergies:  Patient has no known allergies.    Social History:   Social History     Tobacco Use     Smoking status: Never     Smokeless tobacco: Never   Substance Use Topics     Alcohol use: Yes     Drug use: No       Family History:     Family History   Problem Relation Age of Onset     Diabetes Paternal Aunt       Diabetes Other      Reports history of aneurysm in aunt.  No family history of stroke or blood clots.    ROS:  10-point ROS negative except as in HPI     PHYSICAL EXAM:    Vitals:    23 0915   BP: 125/84   BP Location: Left arm   Patient Position: Sitting   Cuff Size: Adult Regular   Pulse: 65   Resp: 20   SpO2: 98%     Labs:   10/14/22  A1C 5.3  Lipid profile total cholesterol 274, , HDL 56, , non     Other Imaging:   10/2022 MRA Brain:  1. Severe stenosis of the proximal left M1 segment of the middle  cerebral artery likely with short segment occlusion and  reconstitution. This is similar to 2018 CTA. The distal branches  of the left MCA are patent although small in caliber which is also  similar to prior.  2. Tiny questionable saccular aneurysm measuring 2 x 1 mm of the P1 segment of the right posterior cerebral artery.    2018 CTA head:  1. Progressive left proximal left M1 segment stenosis since 3/3/2018, now with complete short segment occlusion and reconstitution of the mid to distal left M1 segment and distal left MCA branches via leptomeningeal collaterals. No new focus of intracranial arterial stenosis.  2. Normal neck CTA:   3. Normal head CT. Resolution of previously seen left frontal convexity subarachnoid hemorrhage.      ASSESSMENT/PLAN:  Aliya Rios is a 33 year old  here for [facility] preconception consultation for history of spontaneous subarachnoid hemorrhage in 2018.  Also with history of hyperlipidemia and BMI 32.    Hx spontaneous subarachnoid hemorrhage  Today we reviewed Aliya's records from neurosurgery consultation with Dr. Torres 10/31/22.  See details as outlined in HPI above.  Dr. Torres has reviewed her imaging and does not feel that the possible tiny aneurysm seen at the proximal PCA segment is responsible for the cortical SAH in 2018.  He does not feel treatment is indicated at this time and recommends monitoring including repeat imaging and visit in 1 year.   Dr. Torres does not feel this finding should preclude conception or pose significant risk in the setting of pregnancy.  We discussed today that we anticipate that she will do well in future pregnancy and that there is no contraindication to move forward with fertility planning and pregnancy at this time.     Recommendations:  - Continue 81 mg ASA  - Annual follow up with Dr. Torres, Neurosurgery     Hyperlipidemia  We recommend continued care with Dr. Mercer for lifelong management. During pregnancy, we would expect cholesterol levels to rise. We recommend against the use of statins during pregnancy.     Recommendations:  - Discontinuation of statin at time of attempted conception.     Possible Borderline Chronic Hypertension  Aliya has never been formally diagnosed with chronic hypertension.  She has had some intermittently elevated BP readings in the past but per Dr. Mercer does not warrant antihypertensive therapy.  She is normotensive today.  Recommend continued care with PCP for routine BP monitoring.  We reviewed that if she was diagnosed with CHTN, such patients are more likely to develop preeclampsia during the pregnancy.  Women with chronic hypertension are at increased risk for early-onset preeclampsia.  Low dose aspirin has been used to lower this risk.  Chronic hypertension also increases the risk of maternal stroke, pulmonary edema, renal failure, gestational diabetes, iatrogenic  birth, fetal growth restriction, placental abruption and  mortality rate including stillbirth. Patient has normal blood pressure today, and has not required medications to manage her blood pressure.     Recommendations:  - Continue BP monitoring with PCP.     BMI 32  Pregnancy complications are increased in patients with obesity, even in the absence of additional comorbidity.  These risks include, but are not limited to, gestational diabetes, hypertensive disorders of pregnancy, depression and anxiety, VTE,   birth, failed induction of labor,  delivery, operative delivery, postpartum hemorrhage, postoperative infection, subfertility, miscarriage, congenital anomaly, stillbirth, large for gestational age, shoulder dystocia, postpartum weight retention, and difficulty with breastfeeding.  The risk of antepartum stillbirth increases with increasing BMI and gestational age. Women with BMI ? 30 are less likely than their normal weight counterparts to enter spontaneous labor.  For these reasons, determining the optimal timing of delivery for these patients becomes a balance of decreasing preventable stillbirth risk with minimizing  delivery risk.  This balance is further complicated by the fact that women with BMI ? 30 are more likely to have a failed induction of labor than normal weight women.     Recommendations:  - Early pregnancy screening for undiagnosed type 2 diabetes is suggested in women who are overweight or have obesity with additional risk factors including physical inactivity, first-degree relative with diabetes, high-risk race or ethnicity, previous infant > 4000 g, previous GDM, hypertension, HDL < 35 mg/dL or TG >250 mg/dL, PCOS, A1C > 5.7%, any impaired glucose tolerance on prior testing, history of CV disease, or evidence of acanthosis nigricans. If within normal limits, GCT should be repeated at 24-28 weeks of gestation.  - Advise weight gain of 11-20 pounds during pregnancy. Provide counseling on exercise and nutrition in pregnancy including 20-30 minutes of moderate exercise most days of the week.  - Comprehensive ultrasound at 18-20 weeks gestation    Preconception Counseling  We discussed the goal of optimizing health prior to pregnancy.     Recommendations:  - Avoidance of alcohol  - Daily prenatal vitamin  - Daily folic acid supplement (at least 400 mcg daily) 3 months prior to conception.   - Continue diet and exercise    Patient seen and staffed with Dr. Gilberto Woodson  MD Jimi   Maternal-Fetal Medicine Fellow, PGY6  5/23/2023 8:54 AM     Physician Attestation   I saw this patient with the fellow and agree with the resident/fellow's findings and plan of care as documented in the note and any necessary edits have been made by me.    40 MINUTES SPENT BY ME on the date of service doing chart review, history, exam, documentation & further activities per the note.    Helga Macias MD  Date of Service (when I saw the patient): 5/23/23

## 2023-05-23 ENCOUNTER — OFFICE VISIT (OUTPATIENT)
Dept: MATERNAL FETAL MEDICINE | Facility: CLINIC | Age: 33
End: 2023-05-23
Attending: OBSTETRICS & GYNECOLOGY
Payer: COMMERCIAL

## 2023-05-23 VITALS
HEART RATE: 65 BPM | RESPIRATION RATE: 20 BRPM | DIASTOLIC BLOOD PRESSURE: 84 MMHG | SYSTOLIC BLOOD PRESSURE: 125 MMHG | OXYGEN SATURATION: 98 %

## 2023-05-23 DIAGNOSIS — Z31.69 ENCOUNTER FOR PRECONCEPTION CONSULTATION: ICD-10-CM

## 2023-05-23 PROCEDURE — G0463 HOSPITAL OUTPT CLINIC VISIT: HCPCS | Performed by: OBSTETRICS & GYNECOLOGY

## 2023-05-23 PROCEDURE — 99215 OFFICE O/P EST HI 40 MIN: CPT | Mod: GC | Performed by: OBSTETRICS & GYNECOLOGY

## 2023-05-23 ASSESSMENT — PAIN SCALES - GENERAL: PAINLEVEL: NO PAIN (0)

## 2023-05-23 NOTE — NURSING NOTE
Aliya here for MFM consult due h/o HTN, stroke/TIA and obesity. Pt reports she saw Neurosurgery 10/31/22 and needs no f/u at this time.  Dr. Macias and Dr. Krause in to see pt.  See consult note. Pt left amb and stable. Joaquina Lopez RN

## 2023-06-04 ENCOUNTER — HEALTH MAINTENANCE LETTER (OUTPATIENT)
Age: 33
End: 2023-06-04

## 2023-06-13 ENCOUNTER — HOSPITAL ENCOUNTER (OUTPATIENT)
Dept: GENERAL RADIOLOGY | Facility: CLINIC | Age: 33
Discharge: HOME | End: 2023-06-13
Attending: OBSTETRICS & GYNECOLOGY | Admitting: OBSTETRICS & GYNECOLOGY
Payer: COMMERCIAL

## 2023-06-13 DIAGNOSIS — Z31.9 PROCREATIVE MANAGEMENT: ICD-10-CM

## 2023-06-13 PROCEDURE — 58340 CATHETER FOR HYSTEROGRAPHY: CPT

## 2023-06-13 PROCEDURE — 255N000002 HC RX 255 OP 636: Performed by: OBSTETRICS & GYNECOLOGY

## 2023-06-13 RX ORDER — IOPAMIDOL 510 MG/ML
50 INJECTION, SOLUTION INTRAVASCULAR ONCE
Status: COMPLETED | OUTPATIENT
Start: 2023-06-13 | End: 2023-06-13

## 2023-06-13 RX ADMIN — IOPAMIDOL 20 ML: 510 INJECTION, SOLUTION INTRAVASCULAR at 15:11

## 2023-09-18 ENCOUNTER — TELEPHONE (OUTPATIENT)
Dept: NEUROSURGERY | Facility: CLINIC | Age: 33
End: 2023-09-18
Payer: COMMERCIAL

## 2023-09-19 ENCOUNTER — TELEPHONE (OUTPATIENT)
Dept: NEUROSURGERY | Facility: CLINIC | Age: 33
End: 2023-09-19
Payer: COMMERCIAL

## 2023-09-20 ENCOUNTER — TELEPHONE (OUTPATIENT)
Dept: NEUROSURGERY | Facility: CLINIC | Age: 33
End: 2023-09-20
Payer: COMMERCIAL

## 2023-10-05 ENCOUNTER — ANCILLARY PROCEDURE (OUTPATIENT)
Dept: MRI IMAGING | Facility: CLINIC | Age: 33
End: 2023-10-05
Attending: NEUROLOGICAL SURGERY
Payer: COMMERCIAL

## 2023-10-05 DIAGNOSIS — I67.1 CEREBRAL ANEURYSM, NONRUPTURED: ICD-10-CM

## 2023-10-05 PROCEDURE — 70544 MR ANGIOGRAPHY HEAD W/O DYE: CPT | Mod: GC | Performed by: RADIOLOGY

## 2023-10-25 ENCOUNTER — VIRTUAL VISIT (OUTPATIENT)
Dept: NEUROSURGERY | Facility: CLINIC | Age: 33
End: 2023-10-25
Payer: COMMERCIAL

## 2023-10-25 VITALS — WEIGHT: 200 LBS | BODY MASS INDEX: 36.58 KG/M2

## 2023-10-25 DIAGNOSIS — I67.1 CEREBRAL ANEURYSM, NONRUPTURED: Primary | ICD-10-CM

## 2023-10-25 PROCEDURE — 99213 OFFICE O/P EST LOW 20 MIN: CPT | Mod: VID | Performed by: NURSE PRACTITIONER

## 2023-10-25 RX ORDER — LETROZOLE 2.5 MG/1
TABLET, FILM COATED ORAL
COMMUNITY
Start: 2023-10-23

## 2023-10-25 ASSESSMENT — PAIN SCALES - GENERAL: PAINLEVEL: NO PAIN (0)

## 2023-10-25 NOTE — NURSING NOTE
Is the patient currently in the state of [place]? YES    Visit mode:VIDEO    If the visit is dropped, the patient can be reconnected by: VIDEO VISIT: Text to cell phone:   Telephone Information:   Mobile [phone]       Will anyone else be joining the visit? NO  (If patient encounters technical issues they should call [phone])    How would you like to obtain your AVS? MyChart    Are changes needed to the allergy or medication list? Yes patient Coq-10, & OTC Prenatal vitamin    Reason for visit: RECHTIMMY Silva VVF

## 2023-10-25 NOTE — PROGRESS NOTES
Virtual Visit Details    Type of service:  Video Visit     Originating Location (pt. Location): Home    Distant Location (provider location):  Off-site  Platform used for Video Visit: [facility]  Department of Neurosurgery      Name: Aliya Rios  MRN: [id number]  Age: 33 year old  : 1990  Referring provider: Gurpreet Torres  10/25/2023      Chief Complaint:   Subarachnoid hemorrhage in 2018  Left MCA stenosis  Cerebral aneurysm, unruptured  Follow-up patient    History of Present Illness:   Aliya Rios is a 33 year old female with a history of left frontal subarachnoid hemorrhage in 2018, cerebral aneurysm, unruptured who is here today for follow-up.    In 2018, patient presented with left-sided headache, facial numbness and aphasia and was found to have a left frontal subacute subarachnoid hemorrhage.  Diagnostic angiogram at that time was negative for aneurysmal bleed, venous thrombosis but had incidental finding of severe stenosis of left M1 with collateral pattern.  Based on prior notes, this finding and clinical picture not clearly consistent with RCVS, moyamoya or arthrosclerotic narrowing: Stenosis likely chronic and thought to be unrelated to presentation.    Then in 2022, patient had a follow-up MRA brain which showed tiny questionable saccular aneurysm of the P1 segment of right posterior cerebral artery.  It also showed severe stenosis of the proximal left M1 segment of the middle cerebral artery similar to previous imaging in 2018.  She was seen by Dr. Torres on 10/31/2022.  Per clinic notes, this aneurysm was not found on previous imaging in 2018.  1 year follow-up MR angiogram was recommended at that time.    Today I had a video visit with the patient.  Overall she has been doing well.  No new neurological concerns.  She completed a brain MRA recently.  Please see the results below.        Review of Systems:   Pertinent items are noted in HPI or  as in patient entered ROS below, remainder of complete ROS is negative.        No data to display                 Active Medications:     Current Outpatient Medications:      aspirin 81 MG chewable tablet, Take 1 tablet (81 mg) by mouth daily, Disp: 30 tablet, Rfl: 11     letrozole (FEMARA) 2.5 MG tablet, TAKE 3 TABLETS BY MOUTH ONCE DAILY ON CYCLE DAYS 3-7, Disp: , Rfl:      atorvastatin (LIPITOR) 20 MG tablet, Take 1 tablet (20 mg) by mouth daily, Disp: 30 tablet, Rfl: 3      Allergies:   Patient has no known allergies.      Past Medical History:  Past Medical History:   Diagnosis Date     Infertility, female 8/2021     Middle cerebral artery stenosis      Migraines      Obesity      Subarachnoid hemorrhage (H)      Patient Active Problem List   Diagnosis     Subarachnoid hemorrhage (H)     Migraines        Past Surgical History:  No past surgical history on file.    Family History:   Family History   Problem Relation Age of Onset     Diabetes Paternal Aunt      Diabetes Other          Social History:   Social History     Tobacco Use     Smoking status: Never     Smokeless tobacco: Never   Substance Use Topics     Alcohol use: Yes     Drug use: No        Physical Exam:   Wt 90.7 kg (200 lb)   BMI 36.58 kg/m     General: No acute distress.   Neuro: The patient is fully oriented. Speech is normal.   Psych: Normal mood and affect. Behavior is normal.      Imaging:  10/5/2023 MRA brain:  Impression:    1. Stable moyamoya phenomenon with severe M1 left middle cerebral  artery stenosis (versus short segment occlusion) with adjacent  moyamoya-like collaterals since the study one year ago, which has  progressed since the MRA in 2018.  2. Unchanged tiny cylindrical aneurysm versus infundibulum measuring 2  x 1 mm of the P1 segment of the right posterior cerebral artery since  2018.     Assessment:  Subarachnoid hemorrhage in March 2018  Cerebral aneurysm, unruptured  Follow-up patient    Plan:  Recent MRA brain shows  stable findings.  We will review this with Dr. Torres and will contact the patient with follow-up plan.  We will tentatively plan for a follow-up MRA in 1 year from now.      Cate Max CNP  Department of Neurosurgery    I spent 20 minutes on patient care activities related to this encounter on the date of service, including time spent reviewing the chart, obtaining history and examination and in counseling the patient, and in documentation in the electronic medical record.

## 2023-10-25 NOTE — LETTER
10/25/2023       RE: Aliya Rios  [address] [address]       Dear Colleague,    Thank you for referring your patient, Aliya Rios, to the [facility]UROS[facility] [place] at [facility]. Please see a copy of my visit note below.      [facility]  Department of Neurosurgery      Name: Aliya Rios  MRN: [id number]  Age: 33 year old  : 1990  Referring provider: Gurpreet Torres  10/25/2023      Chief Complaint:   Subarachnoid hemorrhage in 2018  Left MCA stenosis  Cerebral aneurysm, unruptured  Follow-up patient    History of Present Illness:   Aliya Rios is a 33 year old female with a history of left frontal subarachnoid hemorrhage in 2018, cerebral aneurysm, unruptured who is here today for follow-up.    In 2018, patient presented with left-sided headache, facial numbness and aphasia and was found to have a left frontal subacute subarachnoid hemorrhage.  Diagnostic angiogram at that time was negative for aneurysmal bleed, venous thrombosis but had incidental finding of severe stenosis of left M1 with collateral pattern.  Based on prior notes, this finding and clinical picture not clearly consistent with RCVS, moyamoya or arthrosclerotic narrowing: Stenosis likely chronic and thought to be unrelated to presentation.    Then in 2022, patient had a follow-up MRA brain which showed tiny questionable saccular aneurysm of the P1 segment of right posterior cerebral artery.  It also showed severe stenosis of the proximal left M1 segment of the middle cerebral artery similar to previous imaging in 2018.  She was seen by Dr. Torres on 10/31/2022.  Per clinic notes, this aneurysm was not found on previous imaging in 2018.  1 year follow-up MR angiogram was recommended at that time.    Today I had a video visit with the patient.  Overall she has been doing well.  No new neurological concerns.  She completed a  brain MRA recently.  Please see the results below.        Review of Systems:   Pertinent items are noted in HPI or as in patient entered ROS below, remainder of complete ROS is negative.        No data to display                 Active Medications:     Current Outpatient Medications:     aspirin 81 MG chewable tablet, Take 1 tablet (81 mg) by mouth daily, Disp: 30 tablet, Rfl: 11    letrozole (FEMARA) 2.5 MG tablet, TAKE 3 TABLETS BY MOUTH ONCE DAILY ON CYCLE DAYS 3-7, Disp: , Rfl:     atorvastatin (LIPITOR) 20 MG tablet, Take 1 tablet (20 mg) by mouth daily, Disp: 30 tablet, Rfl: 3      Allergies:   Patient has no known allergies.      Past Medical History:  Past Medical History:   Diagnosis Date    Infertility, female 8/2021    Middle cerebral artery stenosis     Migraines     Obesity     Subarachnoid hemorrhage (H)      Patient Active Problem List   Diagnosis    Subarachnoid hemorrhage (H)    Migraines        Past Surgical History:  No past surgical history on file.    Family History:   Family History   Problem Relation Age of Onset    Diabetes Paternal Aunt     Diabetes Other          Social History:   Social History     Tobacco Use    Smoking status: Never    Smokeless tobacco: Never   Substance Use Topics    Alcohol use: Yes    Drug use: No        Physical Exam:   Wt 90.7 kg (200 lb)   BMI 36.58 kg/m     General: No acute distress.   Neuro: The patient is fully oriented. Speech is normal.   Psych: Normal mood and affect. Behavior is normal.      Imaging:  10/5/2023 MRA brain:  Impression:    1. Stable moyamoya phenomenon with severe M1 left middle cerebral  artery stenosis (versus short segment occlusion) with adjacent  moyamoya-like collaterals since the study one year ago, which has  progressed since the MRA in 2018.  2. Unchanged tiny cylindrical aneurysm versus infundibulum measuring 2  x 1 mm of the P1 segment of the right posterior cerebral artery since  2018.     Assessment:  Subarachnoid hemorrhage in  March 2018  Cerebral aneurysm, unruptured  Follow-up patient    Plan:  Recent MRA brain shows stable findings.  We will review this with Dr. Torres and will contact the patient with follow-up plan.  We will tentatively plan for a follow-up MRA in 1 year from now.    I spent 20 minutes on patient care activities related to this encounter on the date of service, including time spent reviewing the chart, obtaining history and examination and in counseling the patient, and in documentation in the electronic medical record.        Again, thank you for allowing me to participate in the care of your patient.      Sincerely,    ALEXY Colón CNP

## 2024-07-14 ENCOUNTER — HEALTH MAINTENANCE LETTER (OUTPATIENT)
Age: 34
End: 2024-07-14

## 2025-07-19 ENCOUNTER — HEALTH MAINTENANCE LETTER (OUTPATIENT)
Age: 35
End: 2025-07-19

## (undated) RX ORDER — FENTANYL CITRATE 50 UG/ML
INJECTION, SOLUTION INTRAMUSCULAR; INTRAVENOUS
Status: DISPENSED
Start: 2018-03-04

## (undated) RX ORDER — LIDOCAINE HYDROCHLORIDE 10 MG/ML
INJECTION, SOLUTION EPIDURAL; INFILTRATION; INTRACAUDAL; PERINEURAL
Status: DISPENSED
Start: 2018-03-04

## (undated) RX ORDER — VERAPAMIL HYDROCHLORIDE 2.5 MG/ML
INJECTION, SOLUTION INTRAVENOUS
Status: DISPENSED
Start: 2018-03-04